# Patient Record
Sex: MALE | Race: WHITE
[De-identification: names, ages, dates, MRNs, and addresses within clinical notes are randomized per-mention and may not be internally consistent; named-entity substitution may affect disease eponyms.]

---

## 2017-07-04 ENCOUNTER — HOSPITAL ENCOUNTER (INPATIENT)
Dept: HOSPITAL 10 - E/R | Age: 57
LOS: 3 days | Discharge: LEFT BEFORE DISCHARGE | DRG: 193 | End: 2017-07-07
Payer: MEDICAID

## 2017-07-04 VITALS
WEIGHT: 315 LBS | BODY MASS INDEX: 37.19 KG/M2 | HEIGHT: 77 IN | WEIGHT: 315 LBS | BODY MASS INDEX: 37.19 KG/M2 | HEIGHT: 77 IN

## 2017-07-04 DIAGNOSIS — J18.9: Primary | ICD-10-CM

## 2017-07-04 DIAGNOSIS — I31.3: ICD-10-CM

## 2017-07-04 DIAGNOSIS — I11.0: ICD-10-CM

## 2017-07-04 DIAGNOSIS — F10.10: ICD-10-CM

## 2017-07-04 DIAGNOSIS — I48.91: ICD-10-CM

## 2017-07-04 DIAGNOSIS — E78.5: ICD-10-CM

## 2017-07-04 DIAGNOSIS — I50.33: ICD-10-CM

## 2017-07-04 DIAGNOSIS — E66.9: ICD-10-CM

## 2017-07-04 DIAGNOSIS — I89.0: ICD-10-CM

## 2017-07-04 DIAGNOSIS — E11.65: ICD-10-CM

## 2017-07-04 LAB
ABNORMAL IP MESSAGE: 1
ADD SCAN DIFF: NO
ADD UMIC: NO
ALBUMIN SERPL-MCNC: 3.6 G/DL (ref 3.3–4.9)
ALBUMIN/GLOB SERPL: 1.38 {RATIO}
ALP SERPL-CCNC: 145 IU/L (ref 42–121)
ALT SERPL-CCNC: 75 IU/L (ref 13–69)
ANION GAP SERPL CALC-SCNC: 17 MMOL/L (ref 8–16)
APTT BLD: 32.2 SEC (ref 25–35)
AST SERPL-CCNC: 54 IU/L (ref 15–46)
BASOPHILS # BLD AUTO: 0 10^3/UL (ref 0–0.1)
BASOPHILS NFR BLD: 0.2 % (ref 0–2)
BILIRUB DIRECT SERPL-MCNC: 0 MG/DL (ref 0–0.2)
BILIRUB SERPL-MCNC: 0.4 MG/DL (ref 0.2–1.3)
BNP SERPL-MCNC: 1380 PG/ML (ref 0–125)
BUN SERPL-MCNC: 14 MG/DL (ref 7–20)
CALCIUM SERPL-MCNC: 8.9 MG/DL (ref 8.4–10.2)
CHLORIDE SERPL-SCNC: 91 MMOL/L (ref 97–110)
CO2 SERPL-SCNC: 28 MMOL/L (ref 21–31)
COLOR UR: (no result)
CREAT SERPL-MCNC: 0.61 MG/DL (ref 0.61–1.24)
EOSINOPHIL # BLD: 0.1 10^3/UL (ref 0–0.5)
EOSINOPHIL NFR BLD: 1.4 % (ref 0–7)
ERYTHROCYTE [DISTWIDTH] IN BLOOD BY AUTOMATED COUNT: 12.8 % (ref 11.5–14.5)
GLOBULIN SER-MCNC: 2.6 G/DL (ref 1.3–3.2)
GLUCOSE SERPL-MCNC: 370 MG/DL (ref 70–220)
GLUCOSE UR STRIP-MCNC: (no result) MG/DL
HCT VFR BLD CALC: 45 % (ref 42–52)
HGB BLD-MCNC: 14.7 G/DL (ref 14–18)
INR PPP: 1.1
KETONES UR STRIP.AUTO-MCNC: NEGATIVE MG/DL
LYMPHOCYTES # BLD AUTO: 2 10^3/UL (ref 0.8–2.9)
LYMPHOCYTES NFR BLD AUTO: 22.3 % (ref 15–51)
MCH RBC QN AUTO: 30.6 PG (ref 29–33)
MCHC RBC AUTO-ENTMCNC: 32.7 G/DL (ref 32–37)
MCV RBC AUTO: 93.6 FL (ref 82–101)
MONOCYTES # BLD: 0.7 10^3/UL (ref 0.3–0.9)
MONOCYTES NFR BLD: 8.4 % (ref 0–11)
NEUTROPHILS # BLD: 6 10^3/UL (ref 1.6–7.5)
NEUTROPHILS NFR BLD AUTO: 67.4 % (ref 39–77)
NITRITE UR QL STRIP.AUTO: NEGATIVE MG/DL
NRBC # BLD MANUAL: 0 10^3/UL (ref 0–0)
NRBC BLD QL: 0 /100WBC (ref 0–0)
PLATELET # BLD: 159 10^3/UL (ref 140–415)
PMV BLD AUTO: 13.3 FL (ref 7.4–10.4)
POTASSIUM SERPL-SCNC: 4.1 MMOL/L (ref 3.5–5.1)
PROT SERPL-MCNC: 6.2 G/DL (ref 6.1–8.1)
PROTHROMBIN TIME: 14.2 SEC (ref 12.2–14.2)
PT RATIO: 1.1
RBC # BLD AUTO: 4.81 10^6/UL (ref 4.7–6.1)
RBC # UR AUTO: NEGATIVE MG/DL
SODIUM SERPL-SCNC: 132 MMOL/L (ref 135–144)
TROPONIN I SERPL-MCNC: 0.02 NG/ML (ref 0–0.12)
UR ASCORBIC ACID: NEGATIVE MG/DL
UR BILIRUBIN (DIP): NEGATIVE MG/DL
UR CLARITY: CLEAR
UR PH (DIP): 6 (ref 5–9)
UR SPECIFIC GRAVITY (DIP): 1 (ref 1–1.03)
UR TOTAL PROTEIN (DIP): NEGATIVE MG/DL
UROBILINOGEN UR STRIP-ACNC: NEGATIVE MG/DL
WBC # BLD AUTO: 8.8 10^3/UL (ref 4.8–10.8)
WBC # UR STRIP: NEGATIVE LEU/UL

## 2017-07-04 PROCEDURE — 76705 ECHO EXAM OF ABDOMEN: CPT

## 2017-07-04 PROCEDURE — 83605 ASSAY OF LACTIC ACID: CPT

## 2017-07-04 PROCEDURE — 83735 ASSAY OF MAGNESIUM: CPT

## 2017-07-04 PROCEDURE — 93306 TTE W/DOPPLER COMPLETE: CPT

## 2017-07-04 PROCEDURE — C9113 INJ PANTOPRAZOLE SODIUM, VIA: HCPCS

## 2017-07-04 PROCEDURE — 93005 ELECTROCARDIOGRAM TRACING: CPT

## 2017-07-04 PROCEDURE — 85025 COMPLETE CBC W/AUTO DIFF WBC: CPT

## 2017-07-04 PROCEDURE — 80061 LIPID PANEL: CPT

## 2017-07-04 PROCEDURE — 85610 PROTHROMBIN TIME: CPT

## 2017-07-04 PROCEDURE — 96375 TX/PRO/DX INJ NEW DRUG ADDON: CPT

## 2017-07-04 PROCEDURE — 36415 COLL VENOUS BLD VENIPUNCTURE: CPT

## 2017-07-04 PROCEDURE — 80048 BASIC METABOLIC PNL TOTAL CA: CPT

## 2017-07-04 PROCEDURE — 71275 CT ANGIOGRAPHY CHEST: CPT

## 2017-07-04 PROCEDURE — 87040 BLOOD CULTURE FOR BACTERIA: CPT

## 2017-07-04 PROCEDURE — 96374 THER/PROPH/DIAG INJ IV PUSH: CPT

## 2017-07-04 PROCEDURE — 82550 ASSAY OF CK (CPK): CPT

## 2017-07-04 PROCEDURE — 83036 HEMOGLOBIN GLYCOSYLATED A1C: CPT

## 2017-07-04 PROCEDURE — 81003 URINALYSIS AUTO W/O SCOPE: CPT

## 2017-07-04 PROCEDURE — 80053 COMPREHEN METABOLIC PANEL: CPT

## 2017-07-04 PROCEDURE — 87086 URINE CULTURE/COLONY COUNT: CPT

## 2017-07-04 PROCEDURE — 84100 ASSAY OF PHOSPHORUS: CPT

## 2017-07-04 PROCEDURE — 82553 CREATINE MB FRACTION: CPT

## 2017-07-04 PROCEDURE — 84443 ASSAY THYROID STIM HORMONE: CPT

## 2017-07-04 PROCEDURE — 84484 ASSAY OF TROPONIN QUANT: CPT

## 2017-07-04 PROCEDURE — 74176 CT ABD & PELVIS W/O CONTRAST: CPT

## 2017-07-04 PROCEDURE — 82962 GLUCOSE BLOOD TEST: CPT

## 2017-07-04 PROCEDURE — 83880 ASSAY OF NATRIURETIC PEPTIDE: CPT

## 2017-07-04 PROCEDURE — 85730 THROMBOPLASTIN TIME PARTIAL: CPT

## 2017-07-04 PROCEDURE — 71010: CPT

## 2017-07-04 RX ADMIN — DILTIAZEM HCL 125 MG/125ML IN DEXTROSE 5% IV SOLN (1 MG/ML) SCH MLS/HR: 125-5/125 SOLUTION at 23:04

## 2017-07-04 NOTE — ERA
ER Documentation


Chief Complaint


Date/Time


DATE: 17 


TIME: 21:10


Chief Complaint


RA89 SOB,CP,hx pulm edema,88% on 2 lpm via NC





HPI


57-year-old male with history of diabetes, hypertension, hyperlipidemia and 

heart disease presents to the ED via rescue ambulance complaining of a 3 day 

history of worsening shortness of breath and several hour history of increasing

, sharp and pressure-like nonradiating chest pain.  Nausea with one episode of 

nonbloody nonbilious emesis but no diaphoresis.   Received aspirin, sublingual 

nitroglycerin and nebulized albuterol in route with some relief.Also 

complaining of a several day history of subjective fevers but denies cough or 

sputum production.  No abdominal pain, nausea, vomiting, diarrhea or 

constipation.  Leg swelling but no pain.  No dysuria, polyuria or flank pain.





ROS


All systems reviewed and are negative except as per history of present illness.





Allergies


Allergies:  


Coded Allergies:  


     No Known Allergy (Unverified , 17)





PMhx/Soc


Reviewed in chart. As per HPI.


Hx Cardiac Disorders:  Yes (A. Fib, HTN)


Hx Miscellaneous Medical Probl:  Yes (DM)


Hx Alcohol Use:  No


Hx Substance Use:  No


Hx Tobacco Use:  No


Smoking Status:  Never smoker





FmHx


No stroke or cancer





Physical Exam


Vitals





Vital Signs








  Date Time  Temp Pulse Resp B/P Pulse Ox O2 Delivery O2 Flow Rate FiO2


 


17 22:28  103 13 133/78 92 Nasal Cannula 5.0 


 


17 21:53  154 37 136/78 97 Nasal Cannula 5.0 


 


17 21:17      Nasal Cannula 5 


 


17 21:11 102.8 144 23 130/87 95   








Physical Exam


Const:    Alert, moderate distress


Head:   Atraumatic 


Eyes:    Normal Conjunctiva


ENT:    Normal External Ears, Nose and Mouth.


Neck:               Full range of motion.  Nontender.


Resp:   Breath sounds diminished at the bases otherwise clear without rales, 

rhonchi or wheezes


Cardio:   Tachycardic, irregular rate and rhythm, no murmurs


Abd:    Soft, obese non tender, non distended. Normal bowel sounds


Skin:    No petechiae or rashes


Back:    No midline or flank tenderness


Ext:    2+ pitting edema bilateral lower external


Neur:    Awake and alert.  No focal deficit observed


Psych:    Normal Mood and Affect


Result Diagram:  


17 0554                                                                    

            17 0554





Results 24 hrs





 Laboratory Tests








Test


  17


21:15 17


22:00


 


White Blood Count 8.810^3/ul  


 


Red Blood Count 4.8110^6/ul  


 


Hemoglobin 14.7g/dl  


 


Hematocrit 45.0%  


 


Mean Corpuscular Volume 93.6fl  


 


Mean Corpuscular Hemoglobin 30.6pg  


 


Mean Corpuscular Hemoglobin


Concent 32.7g/dl 


  


 


 


Red Cell Distribution Width 12.8%  


 


Platelet Count 43515^3/UL  


 


Mean Platelet Volume 13.3fl  


 


Neutrophils % 67.4%  


 


Lymphocytes % 22.3%  


 


Monocytes % 8.4%  


 


Eosinophils % 1.4%  


 


Basophils % 0.2%  


 


Nucleated Red Blood Cells % 0.0/100WBC  


 


Neutrophils # 6.010^3/ul  


 


Lymphocytes # 2.010^3/ul  


 


Monocytes # 0.710^3/ul  


 


Eosinophils # 0.110^3/ul  


 


Basophils # 0.010^3/ul  


 


Nucleated Red Blood Cells # 0.010^3/ul  


 


Prothrombin Time 14.2Sec  


 


Prothrombin Time Ratio 1.1  


 


INR International Normalized


Ratio 1.10 


  


 


 


Activated Partial


Thromboplast Time 32.2Sec 


  


 


 


Sodium Level 132mmol/L  


 


Potassium Level 4.1mmol/L  


 


Chloride Level 91mmol/L  


 


Carbon Dioxide Level 28mmol/L  


 


Anion Gap 17  


 


Blood Urea Nitrogen 14mg/dl  


 


Creatinine 0.61mg/dl  


 


Glucose Level 370mg/dl  


 


Lactic Acid Level 1.3mmol/L  


 


Calcium Level 8.9mg/dl  


 


Magnesium Level 1.4mg/dl  


 


Total Bilirubin 0.4mg/dl  


 


Direct Bilirubin 0.00mg/dl  


 


Indirect Bilirubin 0.4mg/dl  


 


Aspartate Amino Transf


(AST/SGOT) 54IU/L 


  


 


 


Alanine Aminotransferase


(ALT/SGPT) 75IU/L 


  


 


 


Alkaline Phosphatase 145IU/L  


 


Troponin I 0.019ng/ml  


 


B-Type Natriuretic Peptide 1380PG/ML  


 


Total Protein 6.2g/dl  


 


Albumin 3.6g/dl  


 


Globulin 2.60g/dl  


 


Albumin/Globulin Ratio 1.38  


 


Urine Color  STRAW 


 


Urine Clarity  CLEAR 


 


Urine pH  6.0 


 


Urine Specific Gravity  1.005 


 


Urine Ketones  NEGATIVEmg/dL 


 


Urine Nitrite  NEGATIVEmg/dL 


 


Urine Bilirubin  NEGATIVEmg/dL 


 


Urine Urobilinogen  NEGATIVEmg/dL 


 


Urine Leukocyte Esterase  NEGATIVELeu/ul 


 


Urine Hemoglobin  NEGATIVEmg/dL 


 


Urine Glucose  3+mg/dL 


 


Urine Total Protein  NEGATIVEmg/dl 








 Current Medications








 Medications


  (Trade)  Dose


 Ordered  Sig/Amber


 Route


 PRN Reason  Start Time


 Stop Time Status Last Admin


Dose Admin


 


 Nitroglycerin


  (Nitroglycerin


 2% Oint)  1 inch  ONCE  STAT


 TD


   17 21:14


 17 21:16 DC 17 21:23


 


 


 Furosemide


  (Lasix)  40 mg  ONCE  ONCE


 IV


   17 21:30


 17 21:31 DC 17 21:27


 


 


 Acetaminophen


  (Tylenol Tab)  1,000 mg  ONCE  STAT


 PO


   17 21:32


 17 21:33 DC 17 21:48


 


 


 Diltiazem HCl


  (Cardizem Iv)  20 mg  ONCE  ONCE


 IV


   17 22:00


 17 22:03 DC 17 22:11


 











EKG: TIME: 21:30.  Atrial fibrillation with RVR.  Occasional premature 

aberrantly conducted complexes.  Right bundle branch block.  No acute ST 

segment elevation or depression.  EP Interpretation: Abnormal EKG. 





IMAGING:








PROCEDURE:   XR Chest. 


 


CLINICAL INDICATION:     Chest pain.   Shortness of breath


 


TECHNIQUE:   Portable AP semi erect view of the chest was obtained. 


 


COMPARISON:   None. 


 


FINDINGS:


 


The cardiomediastinal silhouette is enlarged.  The lungs are clear.  There is 

no evidence for pleural effusion, pneumothorax or pulmonary vascular 

congestion.  The osseous structures are intact with no evidence for acute 

abnormality.  


 


RPTAT:HJJR


 


IMPRESSION:


 


Cardiac silhouette enlargement without evidence for acute intrathoracic 

pathology.


_____________________________________________ 


Physician Brandon           Date    Time 


Electronically viewed and signed by Physician Brandon on 2017 21:47 


 


D:  2017 21:47  T:  2017 21:47


JR/





Procedures/MDM


DOCUMENTS REVIEWED:  ED nurse, no prior





REEXAMINATION/REEVALUATION: Time:22:15.  Post Cardizem still in atrial 

fibrillation with ventricular rate of 98.  Chest pain decreased.








MEDICAL DECISION MAKIN-year-old male with history of diabetes, hypertension

, hyperlipidemia and heart disease presents to the ED via rescue ambulance 

complaining of a 3 day history of worsening shortness of breath and several 

hour history of increasing, sharp and pressure-like nonradiating chest pain.  

Patient presents with atrial fibrillation with rapid ventricular response rate 

control achieved with diltiazem bolus and continuous infusion.  Chest pain but 

no acute ischemic EKG changes or elevated troponin.  Acute febrile illness with 

multiple SIRS criteria but no source of infection identified.  Chest x-ray is 

negative for effusions or infiltrates.  Abdominal exam is benign without rebound

, guarding or signs of peritonitis.  No elevated lactate or criteria for severe 

sepsis or septic shock.  Hyperglycemia but no DKA or HONK.  Patient will be 

admitted to telemetry for further evaluation and management.





Counseled patient regarding diagnosis, diagnostic results and plan for 

admission. 








Critical Care Note:





Treatments/Evaluations: Close monitoring and treatment of unstable vital signs, 

cardiorespiratory, and neurologic status, while maintaining tight balance of 

fluid, respiratory, and cardiac interventions. This time includes discussing 

the case with the patient and the patient's family. This time does not include 

all procedures stated elsewhere in this record. This time also includes 

reviewing old records, labs and radiological studies. This time includes 

examining and re-examining the patient. Additionally, this time also includes 

arranging care with admitting and consulting physicians.  Total critical care 

time not including other separately reportable procedures: 35 minutes.











CALLS/CONSULTS: Time  22:35, Dr. Gonzales, Recommends admission to telemetry.





PATIENT CARE TRANSITIONED: Time: 22:35 Dr. Gonzales.





Departure


Diagnosis:  


 Primary Impression:  


 Acute chest pain


 Additional Impressions:  


 Atrial fibrillation with rapid ventricular response


 Fever


 Qualified Code:  R50.9 - Fever, unspecified fever cause


 Diabetes mellitus type 2 in obese


 Hypertension


 Qualified Code:  I10 - Essential hypertension


Condition:  Serious











ZACH FERGUSON MD 2017 22:30

## 2017-07-04 NOTE — RADRPT
PROCEDURE:   XR Chest. 

 

CLINICAL INDICATION:     Chest pain.   Shortness of breath

 

TECHNIQUE:   Portable AP semi erect view of the chest was obtained. 

 

COMPARISON:   None. 

 

FINDINGS:

 

The cardiomediastinal silhouette is enlarged.  The lungs are clear.  There is no evidence for pleura
l effusion, pneumothorax or pulmonary vascular congestion.  The osseous structures are intact with n
o evidence for acute abnormality.  

 

RPTAT:HJJR

 

IMPRESSION:

 

Cardiac silhouette enlargement without evidence for acute intrathoracic pathology.

_____________________________________________ 

Physician Brandon           Date    Time 

Electronically viewed and signed by Physician Brandon on 07/04/2017 21:47 

 

D:  07/04/2017 21:47  T:  07/04/2017 21:47

JR/

## 2017-07-05 VITALS — SYSTOLIC BLOOD PRESSURE: 160 MMHG | DIASTOLIC BLOOD PRESSURE: 79 MMHG | RESPIRATION RATE: 18 BRPM | HEART RATE: 110 BPM

## 2017-07-05 VITALS — HEART RATE: 96 BPM

## 2017-07-05 VITALS — DIASTOLIC BLOOD PRESSURE: 82 MMHG | SYSTOLIC BLOOD PRESSURE: 138 MMHG | RESPIRATION RATE: 18 BRPM

## 2017-07-05 VITALS — RESPIRATION RATE: 18 BRPM | SYSTOLIC BLOOD PRESSURE: 115 MMHG | DIASTOLIC BLOOD PRESSURE: 55 MMHG

## 2017-07-05 VITALS — HEART RATE: 112 BPM

## 2017-07-05 VITALS — DIASTOLIC BLOOD PRESSURE: 74 MMHG | SYSTOLIC BLOOD PRESSURE: 115 MMHG | RESPIRATION RATE: 18 BRPM

## 2017-07-05 VITALS — HEART RATE: 117 BPM

## 2017-07-05 VITALS — HEART RATE: 116 BPM

## 2017-07-05 VITALS — HEART RATE: 104 BPM

## 2017-07-05 VITALS — DIASTOLIC BLOOD PRESSURE: 77 MMHG | SYSTOLIC BLOOD PRESSURE: 135 MMHG | RESPIRATION RATE: 20 BRPM

## 2017-07-05 LAB
ABNORMAL IP MESSAGE: 1
ADD SCAN DIFF: NO
ALBUMIN SERPL-MCNC: 3.6 G/DL (ref 3.3–4.9)
ALBUMIN/GLOB SERPL: 1.33 {RATIO}
ALP SERPL-CCNC: 138 IU/L (ref 42–121)
ALT SERPL-CCNC: 70 IU/L (ref 13–69)
ANION GAP SERPL CALC-SCNC: 20 MMOL/L (ref 8–16)
AST SERPL-CCNC: 41 IU/L (ref 15–46)
BASOPHILS # BLD AUTO: 0 10^3/UL (ref 0–0.1)
BASOPHILS NFR BLD: 0.4 % (ref 0–2)
BILIRUB DIRECT SERPL-MCNC: 0 MG/DL (ref 0–0.2)
BILIRUB SERPL-MCNC: 0.4 MG/DL (ref 0.2–1.3)
BUN SERPL-MCNC: 15 MG/DL (ref 7–20)
CALCIUM SERPL-MCNC: 8.7 MG/DL (ref 8.4–10.2)
CHLORIDE SERPL-SCNC: 91 MMOL/L (ref 97–110)
CHOLEST SERPL-MCNC: 87 MG/DL (ref 100–200)
CHOLEST/HDLC SERPL: 4.3 RATIO
CK MB SERPL-MCNC: 1.2 NG/ML (ref 0–2.4)
CK MB SERPL-MCNC: 1.22 NG/ML (ref 0–2.4)
CK SERPL-CCNC: 121 IU/L (ref 23–200)
CK SERPL-CCNC: 94 IU/L (ref 23–200)
CO2 SERPL-SCNC: 29 MMOL/L (ref 21–31)
CREAT SERPL-MCNC: 0.63 MG/DL (ref 0.61–1.24)
EOSINOPHIL # BLD: 0.1 10^3/UL (ref 0–0.5)
EOSINOPHIL NFR BLD: 1.1 % (ref 0–7)
ERYTHROCYTE [DISTWIDTH] IN BLOOD BY AUTOMATED COUNT: 12.6 % (ref 11.5–14.5)
GLOBULIN SER-MCNC: 2.7 G/DL (ref 1.3–3.2)
GLUCOSE SERPL-MCNC: 327 MG/DL (ref 70–220)
HCT VFR BLD CALC: 47.8 % (ref 42–52)
HDLC SERPL-MCNC: 20 MG/DL (ref 28–71)
HGB BLD-MCNC: 15.6 G/DL (ref 14–18)
LYMPHOCYTES # BLD AUTO: 1.4 10^3/UL (ref 0.8–2.9)
LYMPHOCYTES NFR BLD AUTO: 19.6 % (ref 15–51)
MAGNESIUM SERPL-MCNC: 1.5 MG/DL (ref 1.7–2.5)
MCH RBC QN AUTO: 30.8 PG (ref 29–33)
MCHC RBC AUTO-ENTMCNC: 32.6 G/DL (ref 32–37)
MCV RBC AUTO: 94.5 FL (ref 82–101)
MONOCYTES # BLD: 0.5 10^3/UL (ref 0.3–0.9)
MONOCYTES NFR BLD: 7.7 % (ref 0–11)
NEUTROPHILS # BLD: 5 10^3/UL (ref 1.6–7.5)
NEUTROPHILS NFR BLD AUTO: 70.8 % (ref 39–77)
NRBC # BLD MANUAL: 0 10^3/UL (ref 0–0)
NRBC BLD QL: 0 /100WBC (ref 0–0)
PLATELET # BLD: 151 10^3/UL (ref 140–415)
PMV BLD AUTO: 13.2 FL (ref 7.4–10.4)
POTASSIUM SERPL-SCNC: 3.5 MMOL/L (ref 3.5–5.1)
PROT SERPL-MCNC: 6.3 G/DL (ref 6.1–8.1)
RBC # BLD AUTO: 5.06 10^6/UL (ref 4.7–6.1)
SODIUM SERPL-SCNC: 136 MMOL/L (ref 135–144)
TRIGL SERPL-MCNC: 109 MG/DL (ref 0–149)
TROPONIN I SERPL-MCNC: 0.01 NG/ML (ref 0–0.12)
TROPONIN I SERPL-MCNC: 0.03 NG/ML (ref 0–0.12)
TSH SERPL-ACNC: 0.68 MIU/L (ref 0.47–4.68)
WBC # BLD AUTO: 7.1 10^3/UL (ref 4.8–10.8)

## 2017-07-05 RX ADMIN — DILTIAZEM HYDROCHLORIDE SCH MG: 60 TABLET, FILM COATED ORAL at 18:21

## 2017-07-05 RX ADMIN — DILTIAZEM HCL 125 MG/125ML IN DEXTROSE 5% IV SOLN (1 MG/ML) SCH MLS/HR: 125-5/125 SOLUTION at 11:30

## 2017-07-05 RX ADMIN — DILTIAZEM HCL 125 MG/125ML IN DEXTROSE 5% IV SOLN (1 MG/ML) SCH MLS/HR: 125-5/125 SOLUTION at 09:22

## 2017-07-05 RX ADMIN — PANTOPRAZOLE SODIUM SCH MG: 40 INJECTION, POWDER, FOR SOLUTION INTRAVENOUS at 05:16

## 2017-07-05 RX ADMIN — CEFTRIAXONE SCH MLS/HR: 2 INJECTION, SOLUTION INTRAVENOUS at 02:11

## 2017-07-05 RX ADMIN — DOXYCYCLINE SCH MLS/HR: 100 INJECTION, POWDER, LYOPHILIZED, FOR SOLUTION INTRAVENOUS at 10:30

## 2017-07-05 RX ADMIN — DOXYCYCLINE SCH MLS/HR: 100 INJECTION, POWDER, LYOPHILIZED, FOR SOLUTION INTRAVENOUS at 21:07

## 2017-07-05 RX ADMIN — DILTIAZEM HYDROCHLORIDE SCH MG: 60 TABLET, FILM COATED ORAL at 23:47

## 2017-07-05 RX ADMIN — DOXYCYCLINE SCH MLS/HR: 100 INJECTION, POWDER, LYOPHILIZED, FOR SOLUTION INTRAVENOUS at 02:50

## 2017-07-05 RX ADMIN — MAGNESIUM OXIDE TAB 400 MG (241.3 MG ELEMENTAL MG) SCH MG: 400 (241.3 MG) TAB at 21:07

## 2017-07-05 RX ADMIN — LISINOPRIL SCH MG: 5 TABLET ORAL at 08:25

## 2017-07-05 NOTE — HP
Date/Time of Note


Date/Time of Note


DATE: 7/5/17 


TIME: 00:41





Assessment/Plan


VTE Prophylaxis


VTE Prophylaxis Intervention:  SCD's





Lines/Catheters


IV Catheter Type (from [facility]):  Saline Lock





Assessment/Plan


Chief Complaint/Hosp Course


This is a 57-year-old male being admitted to the telemetry floor for:





#1 chest pain: ACS versus A. fib with RVR versus pneumonia.  Patient has a 

significant past medical history we will trend cardiac markers.  Will check a 

2D echo.  As needed nitro.





#2 A. fib with RVR: Currently rate controlled on Cardizem drip.  Patient not on 

any anticoagulation at home except for aspirin.  Patient states that he was 

told in the past that he should not be taking anticoagulation secondary to his 

hemorrhage that resulted in him needing abdominal surgery.  Will defer 

anticoagulation to cardiology.





#3 Fever: Patient came in with normal white blood cell count.  However his 

fever and cough do not fit the clinical picture though his chest x-ray is 

normal and I have suspicion for underlying pneumonia therefore I will order a 

CT of the chest to further evaluate at the current time I will order a CT of 

the chest to further evaluate. 





#4 diabetes mellitus: We will check a hemoglobin A1c level.  Put patient on 

insulin sliding scale.





#5 hypertension: Patient currently does not recall any of his medications.  We 

will start him on a low-dose ACE inhibitor at this time.





#6 History of CHF: BNP of 1300 however his x-ray does not show any vascular 

congestion though he does have lower extremity edema, which also could be due 

to possibly underlying sleep apnea.  The current time will order echocardiogram 

to evaluate and consult cardiology.





#7 DVT and GI prophylaxis: SCDs, Protonix





Further treatment strategy as per the clinical course


Problems:  





HPI/ROS


Admit Date/Time


Admit Date/Time








Hx of Present Illness


Chief complaint: Shortness of breath 3 days, swelling in the leg  5 days





This is a 57-year-old male with history of diabetes, hypertension, 

hyperlipidemia and heart disease presents to the ED via rescue ambulance 

complaining of a 3 day history of worsening shortness of breath and several 

hour history of increasing, sharp and pressure-like nonradiating chest pain.  

Nausea with one episode of nonbloody nonbilious emesis but no diaphoresis.   

Received aspirin, sublingual nitroglycerin and nebulized albuterol in route 

with some relief.Also complaining of a several day history of subjective fevers 

and cough but denies sputum production.  No abdominal pain, nausea, vomiting, 

diarrhea or constipation.  Leg swelling but no pain.  No dysuria, polyuria or 

flank pain.





Allergies: NKDA





Medications: Patient does not know his medications.


Patient states that he was not put on any anticoagulation for his A. fib 

secondary to his previous surgery where he did well had a hemorrhage





ROS


Const: As per HPI


Eyes : No pain discharge or redness or change in visual acuity


ENT: No pain, sore throat, congestion, congestion,  dysphagia or discharge


Respiratory: As per HPI


Cardiovascular: As per HPI


GI : no change in appetite, abdominal pain, nausea, vomiting, diarrhea, 

constipation, or change in the color his stool 


Genitourinary: No dysuria, hematuria, flank pain ,  discharge or CVA tenderness


Musculoskeletal: No joint pain, back pain, neck pain, restricted range of 

motion in neck or joints


Skin: No rash, bruising or hives 


Neuro: No headache, dizziness, syncope, seizure, focal weakness


Endocrine: No polyuria, polydipsia, temperature intolerance


Psych: No hallucination, depression, anxiety or suicidal ideation





PMH/Family/Social


Past Medical History


Diabetes mellitus, hypertension, CHF, atrial fibrillation





Past Surgical History


Abdominal surgery for hemorrhage?





Family History


Significant Family History:  diabetes





Social History


Alcohol Use:  none


Smoking Status:  Never smoker


Drug Use:  none





Exam/Review of Systems


Vital Signs


Vitals





 Vital Signs








  Date Time  Temp Pulse Resp B/P Pulse Ox O2 Delivery O2 Flow Rate FiO2


 


7/4/17 23:35  127 21 128/92 95 Nasal Cannula 4.0 


 


7/4/17 21:11 102.8       














 Intake and Output   


 


 7/4/17 7/4/17 7/5/17





 15:00 23:00 07:00


 


Output Total  675 ml 


 


Balance  -675 ml 











Exam


Exam





General: Patient is a morbidly obese male in no acute distress


HEENT: Atraumatic, normocephalic. The pupils are equal, round and reactive. 

Extraocular motor are intact


Neck: Supple with full range of motion. No rigidity or meningismus


Chest: Nontender


Lungs: Coarse breath sounds at the bases bilaterally


Heart: Normal S1-S2, Regular rhythm and rate. 


Abdomen: Soft, distended, nontender, surgical scar present.  Normal bowel sounds


Extremities: 2+ pitting edema of the bilateral lower extremities


Neurologic: Normal mental status, speech normal, cranial nerves II through XII 

are intact, motor and sensory are intact, no focal weakness





Labs


Result Diagram:  


7/4/17 2115 7/4/17 2115








Medications


Medications





 Current Medications


Diltiazem HCl (Cardizem-D5W 125 Mg/125 ml Drip) 125 ml @  10 mls/hr [id number] IV  

Last administered on 7/4/17at 23:04; Admin Dose 10 MLS/HR;  Start 7/4/17 at 23:

00











ROHITH MCKENZIE Jul 5, 2017 00:51

## 2017-07-05 NOTE — CONS
Date/Time of Note


Date/Time of Note


DATE: 7/5/17 


TIME: 12:40





Assessment/Plan


Assessment/Plan


Additional Assessment/Plan


Likely pneumonia


Acute decompensated diastolic congestive heart failure


Atrial fibrillation with rapid ventricular rates, improved, not on 

anticoagulation secondary to history of GI bleed


Preserved ejection fraction


Sepsis


Hypertension


Small pericardial effusion





-Patient with symptoms of fevers and chills, cough and CT chest evidence of 

pneumonia.  Patient's symptoms have improved with antibiotics.  Also evidence 

of mild decompensated congestive heart failure.  Would continue Lasix at the 

current time as blood pressure renal function permits.  Heart rate trend has 

improved but still on IV Cardizem.  Will start p.o. Cardizem and transition off 

the IV.  Maintain potassium above 4.0 and magnesium above 2.0.  In discussion 

with patient, he had a history of GI bleed on anticoagulation and he was told 

not to take anticoagulation in the future.





Consultation Date/Type/Reason


Admit Date/Time





Type of Consultation:  cv


Reason for Consultation


Atrial fibrillation





Hx of Present Illness


This is a 57-year-old male with past medical history of hypertension, atrial 

fibrillation who presents with 3 days history of fevers and chills, shortness 

of breath and fatigue.  Symptoms progressively worsened and he came to the 

emergency room for evaluation and care.  He is also complaining of cough which 

has been occasionally productive.  With coughing, he developed sharp chest 

discomfort.  He denies any palpitations or dizziness.  He was given nitro 

ointment with no significant change in his chest discomfort but he has 

developed a headache since.  Since his admission and being giving multiple 

medications including antibiotics, he is feeling much better.  Currently denies 

any shortness of breath but complains of fatigue.  His chest discomfort is only 

present with coughing.  Shortness of breath is slightly better.


12 point review of systems was performed with all pertinent positives and 

negatives mentioned above and all else is negative





Past Medical History


Atrial fibrillation


GI bleed


Medical History:  congestive heart failure, diabetes, hypertension





Past Surgical History


GI procedure





Family History


Significant Family History:  no pertinent family hx





Social History


Alcohol Use:  none


Smoking Status:  Never smoker


Drug Use:  none





Exam/Review of Systems


Vital Signs


Vitals





 Vital Signs








  Date Time  Temp Pulse Resp B/P Pulse Ox O2 Delivery O2 Flow Rate FiO2


 


7/5/17 12:07  96      


 


7/5/17 11:12 98.6  18 115/74 91   


 


7/5/17 03:40      Nasal Cannula 2.0 














 Intake and Output   


 


 7/4/17 7/4/17 7/5/17





 14:59 22:59 06:59


 


Output Total  675 ml 550 ml


 


Balance  -675 ml -550 ml











Exam


Alert and oriented but appears tired and following asleep multiple times during 

history taking and examination, no dyspnea, no apparent distress


Constitutional:  obese


Head:  normocephalic


Neck:  supple


Respiratory:  other (Coarse breath sounds bilaterally with scattered mild 

crackles, no wheezing)


Cardiovascular:  irregular rhythm, other (S1-S2 heard)


Gastrointestinal:  bowel sounds, non-tender, other (No guarding), soft


Extremities:  edema





Results


Result Diagram:  


7/5/17 0554                                                                    

            7/5/17 0554





Results 24 hrs





Laboratory Tests








Test


  7/4/17


21:15 7/4/17


22:00 7/5/17


00:35 7/5/17


02:21


 


White Blood Count 8.8     


 


Red Blood Count 4.81     


 


Hemoglobin 14.7     


 


Hematocrit 45.0     


 


Mean Corpuscular Volume 93.6     


 


Mean Corpuscular Hemoglobin 30.6     


 


Mean Corpuscular Hemoglobin


Concent 32.7  


  


  


  


 


 


Red Cell Distribution Width 12.8     


 


Platelet Count 159     


 


Mean Platelet Volume 13.3  H   


 


Neutrophils % 67.4     


 


Lymphocytes % 22.3     


 


Monocytes % 8.4     


 


Eosinophils % 1.4     


 


Basophils % 0.2     


 


Nucleated Red Blood Cells % 0.0     


 


Neutrophils # 6.0     


 


Lymphocytes # 2.0     


 


Monocytes # 0.7     


 


Eosinophils # 0.1     


 


Basophils # 0.0     


 


Nucleated Red Blood Cells # 0.0     


 


Prothrombin Time 14.2     


 


Prothrombin Time Ratio 1.1     


 


INR International Normalized


Ratio 1.10  


  


  


  


 


 


Activated Partial


Thromboplast Time 32.2  


  


  


  


 


 


Sodium Level 132  L   


 


Potassium Level 4.1     


 


Chloride Level 91  L   


 


Carbon Dioxide Level 28     


 


Anion Gap 17  H   


 


Blood Urea Nitrogen 14     


 


Creatinine 0.61     


 


Glucose Level 370  H   


 


Lactic Acid Level 1.3    1.1   1.2  


 


Calcium Level 8.9     


 


Magnesium Level 1.4  L   


 


Total Bilirubin 0.4     


 


Direct Bilirubin 0.00     


 


Indirect Bilirubin 0.4     


 


Aspartate Amino Transf


(AST/SGOT) 54  H


  


  


  


 


 


Alanine Aminotransferase


(ALT/SGPT) 75  H


  


  


  


 


 


Alkaline Phosphatase 145  H   


 


Troponin I 0.019     0.031  


 


B-Type Natriuretic Peptide 1380  H   


 


Total Protein 6.2     


 


Albumin 3.6     


 


Globulin 2.60     


 


Albumin/Globulin Ratio 1.38     


 


Urine Color  STRAW    


 


Urine Clarity  CLEAR    


 


Urine pH  6.0    


 


Urine Specific Gravity  1.005    


 


Urine Ketones  NEGATIVE    


 


Urine Nitrite  NEGATIVE    


 


Urine Bilirubin  NEGATIVE    


 


Urine Urobilinogen  NEGATIVE    


 


Urine Leukocyte Esterase  NEGATIVE    


 


Urine Hemoglobin  NEGATIVE    


 


Urine Glucose  3+  H  


 


Urine Total Protein  NEGATIVE    


 


Creatine Kinase    121  


 


Creatine Kinase Index    1.0  


 


Creatinine Kinase MB (Mass)    1.20  














Test


  7/5/17


05:54 7/5/17


08:22 7/5/17


09:30 


 


 


White Blood Count 7.1     


 


Red Blood Count 5.06     


 


Hemoglobin 15.6     


 


Hematocrit 47.8     


 


Mean Corpuscular Volume 94.5     


 


Mean Corpuscular Hemoglobin 30.8     


 


Mean Corpuscular Hemoglobin


Concent 32.6  


  


  


  


 


 


Red Cell Distribution Width 12.6     


 


Platelet Count 151     


 


Mean Platelet Volume 13.2  H   


 


Neutrophils % 70.8     


 


Lymphocytes % 19.6     


 


Monocytes % 7.7     


 


Eosinophils % 1.1     


 


Basophils % 0.4     


 


Nucleated Red Blood Cells % 0.0     


 


Neutrophils # 5.0     


 


Lymphocytes # 1.4     


 


Monocytes # 0.5     


 


Eosinophils # 0.1     


 


Basophils # 0.0     


 


Nucleated Red Blood Cells # 0.0     


 


Sodium Level 136     


 


Potassium Level 3.5     


 


Chloride Level 91  L   


 


Carbon Dioxide Level 29     


 


Anion Gap 20  H   


 


Blood Urea Nitrogen 15     


 


Creatinine 0.63     


 


Glucose Level 327  H   


 


Calcium Level 8.7     


 


Magnesium Level 1.5  L   


 


Total Bilirubin 0.4     


 


Direct Bilirubin 0.00     


 


Indirect Bilirubin 0.4     


 


Aspartate Amino Transf


(AST/SGOT) 41  


  


  


  


 


 


Alanine Aminotransferase


(ALT/SGPT) 70  H


  


  


  


 


 


Alkaline Phosphatase 138  H   


 


Total Protein 6.3     


 


Albumin 3.6     


 


Globulin 2.70     


 


Albumin/Globulin Ratio 1.33     


 


Triglycerides Level 109     


 


Cholesterol Level 87  L   


 


LDL Cholesterol, Calculated 45     


 


HDL Cholesterol 20  L   


 


Cholesterol/HDL Ratio 4.3     


 


Thyroid Stimulating Hormone


(TSH) 0.675  


  


  


  


 


 


Bedside Glucose  313  H  


 


Creatine Kinase   94   


 


Creatine Kinase Index   1.3   


 


Creatinine Kinase MB (Mass)   1.22   


 


Troponin I   0.013   











Medications


Medications





 Current Medications


Diltiazem HCl (Cardizem-D5W 125 Mg/125 ml Drip) 125 ml @  10 mls/hr [id number] IV  

Last administered on 7/5/17at 09:22; Admin Dose 10 MLS/HR;  Start 7/4/17 at 23:

00


Ondansetron HCl (Zofran Inj) 4 mg Q6H  PRN IV NAUSEA AND/OR VOMITING;  Start 7/5 /17 at 01:00


Nitroglycerin (Nitroglycerin (Sl Tab) 0.4 Mg) 1 tab Q5M  PRN SL CHEST PAIN;  

Start 7/5/17 at 01:00


Acetaminophen (Tylenol Tab) 650 mg Q6H  PRN PO PAIN LEVEL 1-3 OR FEVER;  Start 7 /5/17 at 01:00


Docusate Sodium (Colace) 100 mg Q12H  PRN PO CONSTIPATION;  Start 7/5/17 at 01:

00


Bisacodyl (Dulcolax) 5 mg DAILY  PRN PO CONSTIPATION;  Start 7/5/17 at 01:00


Pantoprazole (Protonix Iv) 40 mg DAILY@06 IV  Last administered on 7/5/17at 05:

16; Admin Dose 40 MG;  Start 7/5/17 at 06:00


Furosemide (Lasix) 40 mg DAILY@06 IV  Last administered on 7/5/17at 05:17; 

Admin Dose 40 MG;  Start 7/5/17 at 06:00


Lisinopril 5 mg 5 mg DAILY PO  Last administered on 7/5/17at 08:25; Admin Dose 

5 MG;  Start 7/5/17 at 09:00


Ceftriaxone Sodium 50 ml @  100 mls/hr Q24H IVPB  Last administered on 7/5/17at 

02:11; Admin Dose 100 MLS/HR;  Start 7/5/17 at 02:00


Doxycycline Hyclate/Sodium Chloride (Vibramycin/NS) 250 ml @  250 mls/hr Q12 

IVPB  Last administered on 7/5/17at 10:30; Admin Dose 250 MLS/HR;  Start 7/5/17 

at 01:00


Diagnostic Test (Pha) (Accu-Chek) 1 ea 02 XX ;  Start 7/6/17 at 02:00


Miscellaneous Information 1 ea NOTE XX ;  Start 7/5/17 at 03:00


Glucose (Glutose) 15 gm Q15M  PRN PO DECREASED GLUCOSE;  Start 7/5/17 at 03:00


Glucose (Glutose) 22.5 gm Q15M  PRN PO DECREASED GLUCOSE;  Start 7/5/17 at 03:00


Dextrose (D50w Syringe) 25 ml Q15M  PRN IV DECREASED GLUCOSE;  Start 7/5/17 at 

03:00


Dextrose (D50w Syringe) 50 ml Q15M  PRN IV DECREASED GLUCOSE;  Start 7/5/17 at 

03:00


Glucagon (Glucagen) 1 mg Q15M  PRN IM DECREASED GLUCOSE;  Start 7/5/17 at 03:00


Glucose (Glutose) 15 gm Q15M  PRN BUCCAL DECREASED GLUCOSE;  Start 7/5/17 at 03:

00


Morphine Sulfate (morphine) 2 mg Q3H  PRN IV PAIN Last administered on 7/5/17at 

10:38; Admin Dose 2 MG;  Start 7/5/17 at 10:30





Procedures


Procedures


ECG done yesterday demonstrates atrial fibrillation and 31 bpm, right bundle 

branch block with  ms, nonspecific ST-T abnormalities











Torey Gold DO Jul 5, 2017 12:49

## 2017-07-05 NOTE — RADRPT
PROCEDURE:   US Abdomen (right upper quadrant). 

 

CLINICAL INDICATION:   Abnormal liver function tests. 

 

TECHNIQUE:   Multiple real-time longitudinal and transverse images of the right upper quadrant of th
e abdomen were acquired utilizing a curved array transducer. Images were reviewed on a high-resoluti
on PACS workstation. 

 

COMPARISON:   CT scan of the abdomen and pelvis done earlier the same day. 

 

FINDINGS:

The liver is enlarged and diffusely increased in echogenicity consistent with fatty metamorphosis.

There is no focal hepatic lesion.  

The gallbladder is normal with no stones or wall thickening.  There is no pericholecystic fluid toribio
ection.

The bile ducts are normal with the common bile duct measuring 5.3 mm in diameter.  

The pancreas is not visualized due to overlying bowel gas.  

No free fluid is present.  

The right kidney measures 13.2 cm.  There is normal  echogenicity of the right kidney.  There is no 
right renal mass or hydronephrosis.  A nonobstructing 0.4 cm calculus is present in the lower right 
kidney. 

 

IMPRESSION:

1.  Hepatomegaly.

2.  Fatty metamorphosis of the liver.

3.  Pancreas not visualized.

4.  Nonobstructing 0.4 cm calculus in the lower right kidney.

5.  Otherwise unremarkable study. 

 

RPTAT: QQ

_____________________________________________ 

.Yaniv Rothman MD, MD           Date    Time 

Electronically viewed and signed by .Yaniv Rothman MD, MD on 07/05/2017 23:13 

 

D:  07/05/2017 23:13  T:  07/05/2017 23:13

.R/

## 2017-07-05 NOTE — RADRPT
PROCEDURE:    CT angiogram of the chest with contrast. 

 

CLINICAL INDICATION:    Chest pain and shortness of breath. 

 

TECHNIQUE:    CT angiogram of the chest was obtained using a multi-detector high-resolution CT.  Con
tiguous axial images were obtained during the dynamic injection of 140 cc of Omnipaque 350 intraveno
us contrast.  Coronal and sagittal reformatted images were obtained.  3-D reformatted images were al
so obtained.  Images were reviewed on a PACS workstation.

 

One or more of the following dose reduction techniques were used:

- Automated exposure control.

- Adjustment of the mA and/or kV according to patient size.

- Use of iterative reconstruction technique.

 

Exam CTD/vol = 24.47 mGy.

Total exam DLP = 1032.24 mGy-cm.   

 

COMPARISON:    None. 

 

FINDINGS:

The main pulmonary artery followed to the segmental divisions are well opacified.  There is no filli
ng defect or evidence of pulmonary embolism.  The heart is moderately enlarged with a small to moder
ate pericardial effusion.  The aorta is of normal course and caliber without evidence of aneurysm or
 dissection.

 

There is no evidence of chest wall mass.  The visualized thyroid is unremarkable.  There are no enla
rged axillary lymph nodes.  There are no enlarged mediastinal or hilar lymph nodes by CT criteria. T
here is bilateral peribronchial thickening.  There are mild scattered air space opacities within the
 right upper and lower lobes.  There is no pleural effusion.  The central tracheobronchial tree is w
ithin normal limits.

 

Limited evaluation of the upper abdomen demonstrates a large ventral hernia containing loops of julissa
l. 

 

IMPRESSION:

No evidence of pulmonary embolism or aortic dissection. 

Moderate cardiomegaly with small to moderate pericardial effusion.

Bilateral peribronchial thickening with mild scattered air space opacities within the right upper an
d lower lobes.

Large ventral abdominal wall hernia containing multiple loops of bowel.  Please refer to CT abdomen 
and pelvis done same day for further details.

_____________________________________________ 

.Deep Hanson MD, MD           Date    Time 

Electronically viewed and signed by .Deep Hanson MD, MD on 07/05/2017 00:19 

 

D:  07/05/2017 00:19  T:  07/05/2017 00:19

.T/

## 2017-07-05 NOTE — RADRPT
PROCEDURE:   CT abdomen and pelvis without intravenous contrast. 

 

CLINICAL INDICATION: Pain.

 

TECHNIQUE:   CT of the abdomen/pelvis was performed utilizing axial images with reconstructions in s
agittal and coronal planes. The administered radiation dose is CTDI 29 mGy, DLP 1823 mGy-cm. 

 

COMPARISON: No pertinent prior examinations were submitted for comparison.

 

FINDINGS:

 

Visualized Chest: Some ground-glass nodular opacities are noted within the lung bases in a peribronc
hial distribution.  There is moderate cardiomegaly with small to moderate pericardial effusion.  Toni
e coronary artery calcifications are noted.

 

Abdomen:

 

The examination is limited by body habitus.  Parts of the anterior abdomen are not included in the f
ield of view.

 

The  spleen, pancreas, gallbladder,and adrenal glands are unremarkable.   The liver is diffusely dec
reased in attenuation, compatible with hepatic steatosis.

 

The kidneys are without hydronephrosis.  No definite urinary calculi are seen.

 

There is a large ventral abdominal hernia which contains most of the small bowel and parts of the la
rge bowel.  The anterior aspect of the hernia is not visualized.  There is no evidence of bowel obst
ruction.

 

There is no evidence of intra-abdominal adenopathy or free fluid.  

 

 

Pelvis:

 

There is no evidence of pelvic adenopathy or free fluid.  The prostate and bladder are unremarkable.

 

Osseous structures: Unremarkable.

 

IMPRESSION:

 

Patchy peribronchial nodular opacities in the lung bases likely due to pneumonia.

 

Moderate cardiomegaly with small to moderate pericardial effusion.

 

Hepatic steatosis.

 

Large ventral abdominal hernia containing most of the bowel.  No evidence of bowel obstruction.

 

RPTAT: HIKT

_____________________________________________ 

.Xu Gray MD, MD           Date    Time 

Electronically viewed and signed by .Xu Gray MD, MD on 07/05/2017 00:26 

 

D:  07/05/2017 00:26  T:  07/05/2017 00:26

.T/

## 2017-07-06 VITALS — DIASTOLIC BLOOD PRESSURE: 76 MMHG | RESPIRATION RATE: 19 BRPM | SYSTOLIC BLOOD PRESSURE: 145 MMHG

## 2017-07-06 VITALS — HEART RATE: 127 BPM

## 2017-07-06 VITALS — HEART RATE: 105 BPM

## 2017-07-06 VITALS — SYSTOLIC BLOOD PRESSURE: 117 MMHG | DIASTOLIC BLOOD PRESSURE: 82 MMHG | RESPIRATION RATE: 19 BRPM

## 2017-07-06 VITALS — SYSTOLIC BLOOD PRESSURE: 135 MMHG | DIASTOLIC BLOOD PRESSURE: 83 MMHG | RESPIRATION RATE: 18 BRPM | HEART RATE: 126 BPM

## 2017-07-06 VITALS — DIASTOLIC BLOOD PRESSURE: 77 MMHG | SYSTOLIC BLOOD PRESSURE: 133 MMHG | RESPIRATION RATE: 20 BRPM

## 2017-07-06 VITALS — RESPIRATION RATE: 19 BRPM | DIASTOLIC BLOOD PRESSURE: 58 MMHG | SYSTOLIC BLOOD PRESSURE: 124 MMHG

## 2017-07-06 VITALS — RESPIRATION RATE: 19 BRPM | HEART RATE: 118 BPM | DIASTOLIC BLOOD PRESSURE: 63 MMHG | SYSTOLIC BLOOD PRESSURE: 137 MMHG

## 2017-07-06 VITALS — HEART RATE: 98 BPM

## 2017-07-06 VITALS — RESPIRATION RATE: 16 BRPM | DIASTOLIC BLOOD PRESSURE: 86 MMHG | HEART RATE: 90 BPM | SYSTOLIC BLOOD PRESSURE: 117 MMHG

## 2017-07-06 LAB
ADD SCAN DIFF: NO
ANION GAP SERPL CALC-SCNC: 19 MMOL/L (ref 8–16)
BASOPHILS # BLD AUTO: 0 10^3/UL (ref 0–0.1)
BASOPHILS NFR BLD: 0.4 % (ref 0–2)
BUN SERPL-MCNC: 19 MG/DL (ref 7–20)
CALCIUM SERPL-MCNC: 9.3 MG/DL (ref 8.4–10.2)
CHLORIDE SERPL-SCNC: 97 MMOL/L (ref 97–110)
CO2 SERPL-SCNC: 31 MMOL/L (ref 21–31)
CREAT SERPL-MCNC: 0.57 MG/DL (ref 0.61–1.24)
EOSINOPHIL # BLD: 0.1 10^3/UL (ref 0–0.5)
EOSINOPHIL NFR BLD: 1.8 % (ref 0–7)
ERYTHROCYTE [DISTWIDTH] IN BLOOD BY AUTOMATED COUNT: 12.5 % (ref 11.5–14.5)
GLUCOSE SERPL-MCNC: 311 MG/DL (ref 70–220)
HCT VFR BLD CALC: 49.1 % (ref 42–52)
HGB BLD-MCNC: 16.1 G/DL (ref 14–18)
LYMPHOCYTES # BLD AUTO: 1.9 10^3/UL (ref 0.8–2.9)
LYMPHOCYTES NFR BLD AUTO: 26.9 % (ref 15–51)
MCH RBC QN AUTO: 30.6 PG (ref 29–33)
MCHC RBC AUTO-ENTMCNC: 32.8 G/DL (ref 32–37)
MCV RBC AUTO: 93.2 FL (ref 82–101)
MONOCYTES # BLD: 0.7 10^3/UL (ref 0.3–0.9)
MONOCYTES NFR BLD: 10 % (ref 0–11)
NEUTROPHILS # BLD: 4.3 10^3/UL (ref 1.6–7.5)
NEUTROPHILS NFR BLD AUTO: 60.3 % (ref 39–77)
NRBC # BLD MANUAL: 0 10^3/UL (ref 0–0)
NRBC BLD QL: 0 /100WBC (ref 0–0)
PLATELET # BLD: 166 10^3/UL (ref 140–415)
PMV BLD AUTO: 12.4 FL (ref 7.4–10.4)
POTASSIUM SERPL-SCNC: 4 MMOL/L (ref 3.5–5.1)
RBC # BLD AUTO: 5.27 10^6/UL (ref 4.7–6.1)
SODIUM SERPL-SCNC: 143 MMOL/L (ref 135–144)
WBC # BLD AUTO: 7.1 10^3/UL (ref 4.8–10.8)

## 2017-07-06 RX ADMIN — CEFTRIAXONE SCH MLS/HR: 2 INJECTION, SOLUTION INTRAVENOUS at 02:24

## 2017-07-06 RX ADMIN — DILTIAZEM HYDROCHLORIDE SCH MG: 60 TABLET, FILM COATED ORAL at 12:19

## 2017-07-06 RX ADMIN — DILTIAZEM HYDROCHLORIDE SCH MG: 60 TABLET, FILM COATED ORAL at 05:30

## 2017-07-06 RX ADMIN — DOXYCYCLINE SCH MLS/HR: 100 INJECTION, POWDER, LYOPHILIZED, FOR SOLUTION INTRAVENOUS at 08:44

## 2017-07-06 RX ADMIN — PANTOPRAZOLE SODIUM SCH MG: 40 INJECTION, POWDER, FOR SOLUTION INTRAVENOUS at 05:25

## 2017-07-06 RX ADMIN — MAGNESIUM OXIDE TAB 400 MG (241.3 MG ELEMENTAL MG) SCH MG: 400 (241.3 MG) TAB at 20:48

## 2017-07-06 RX ADMIN — MAGNESIUM OXIDE TAB 400 MG (241.3 MG ELEMENTAL MG) SCH MG: 400 (241.3 MG) TAB at 08:42

## 2017-07-06 RX ADMIN — LISINOPRIL SCH MG: 5 TABLET ORAL at 08:42

## 2017-07-06 NOTE — PN
Date/Time of Note


Date/Time of Note


DATE: 7/6/17 


TIME: 19:11





Assessment/Plan


VTE Prophylaxis


VTE Prophylaxis Intervention:  SCD's





Lines/Catheters


IV Catheter Type (from Nrs):  Saline Lock





Assessment/Plan


Chief Complaint/Hosp Course


#1 chest pain likely secondary to A. fib with RVR and/or pneumonia


ACS ruled out


Cardiology consultation appreciate





#2 A. fib with RVR: Now off Cardizem drip


Anticoagulation held in the past secondary to history of GI bleed





#3  Pneumonia


Rocephin and azithromycin





#4  Diabetes-uncontrolled


A1c undetectable


Increase insulin regimen


Diabetic educator





#5  Hypertension-controlled


Continue ACE inhibitor





#6  Lower extremity edema with an elevated BNP 


2D echo shows a normal EF and chest x-ray does not show any vascular congestion


Lower extremity edema likely secondary to obesity with lymphedema





 DVT and GI prophylaxis: SCDs, Protonix


Problems:  





Subjective


24 Hr Interval Summary


Constitutional:  no complaints





Exam/Review of Systems


Vital Signs


Vitals





 Vital Signs








  Date Time  Temp Pulse Resp B/P Pulse Ox O2 Delivery O2 Flow Rate FiO2


 


7/6/17 16:00  127      


 


7/6/17 15:13 98.2  19 117/82 94   


 


7/6/17 08:00      Nasal Cannula 5.0 














 Intake and Output   


 


 7/5/17 7/5/17 7/6/17





 15:00 23:00 07:00


 


Intake Total  1100 ml 350 ml


 


Output Total  2200 ml 3125 ml


 


Balance  -1100 ml -2775 ml











Exam


Constitutional:  alert


Respiratory:  clear to auscultation


Cardiovascular:  regular rate and rhythm


Gastrointestinal:  distended, non-tender, soft


Musculoskeletal:  nl extremities to inspection





Results


Result Diagram:  


7/6/17 0620                                                                    

            7/6/17 0620





Results 24 hrs





Laboratory Tests








Test


  7/5/17


21:15 7/6/17


02:22 7/6/17


06:20 7/6/17


07:52


 


Bedside Glucose 270  H 292  H  265  H


 


White Blood Count   7.1   


 


Red Blood Count   5.27   


 


Hemoglobin   16.1   


 


Hematocrit   49.1   


 


Mean Corpuscular Volume   93.2   


 


Mean Corpuscular Hemoglobin   30.6   


 


Mean Corpuscular Hemoglobin


Concent 


  


  32.8  


  


 


 


Red Cell Distribution Width   12.5   


 


Platelet Count   166   


 


Mean Platelet Volume   12.4  H 


 


Neutrophils %   60.3   


 


Lymphocytes %   26.9   


 


Monocytes %   10.0   


 


Eosinophils %   1.8   


 


Basophils %   0.4   


 


Nucleated Red Blood Cells %   0.0   


 


Neutrophils #   4.3   


 


Lymphocytes #   1.9   


 


Monocytes #   0.7   


 


Eosinophils #   0.1   


 


Basophils #   0.0   


 


Nucleated Red Blood Cells #   0.0   


 


Sodium Level   143   


 


Potassium Level   4.0   


 


Chloride Level   97   


 


Carbon Dioxide Level   31   


 


Anion Gap   19  H 


 


Blood Urea Nitrogen   19   


 


Creatinine   0.57  L 


 


Glucose Level   311  H 


 


Hemoglobin A1c      


 


Calcium Level   9.3   


 


Phosphorus Level   4.4   


 


Magnesium Level   1.6  L 














Test


  7/6/17


11:16 7/6/17


11:56 7/6/17


17:04 


 


 


Lab Scanned Report REFERENCE LAB     


 


Bedside Glucose  376  H 297  H 











Medications


Medications





 Current Medications


Ondansetron HCl (Zofran Inj) 4 mg Q6H  PRN IV NAUSEA AND/OR VOMITING;  Start 7/5 /17 at 01:00


Nitroglycerin (Nitroglycerin (Sl Tab) 0.4 Mg) 1 tab Q5M  PRN SL CHEST PAIN;  

Start 7/5/17 at 01:00


Acetaminophen (Tylenol Tab) 650 mg Q6H  PRN PO PAIN LEVEL 1-3 OR FEVER;  Start 7 /5/17 at 01:00


Docusate Sodium (Colace) 100 mg Q12H  PRN PO CONSTIPATION;  Start 7/5/17 at 01:

00


Bisacodyl (Dulcolax) 5 mg DAILY  PRN PO CONSTIPATION;  Start 7/5/17 at 01:00


Lisinopril 5 mg 5 mg DAILY PO  Last administered on 7/6/17at 08:42; Admin Dose 

5 MG;  Start 7/5/17 at 09:00


Ceftriaxone Sodium 50 ml @  100 mls/hr Q24H IVPB  Last administered on 7/6/17at 

02:24; Admin Dose 100 MLS/HR;  Start 7/5/17 at 02:00


Doxycycline Hyclate/Sodium Chloride (Vibramycin/NS) 250 ml @  250 mls/hr Q12 

IVPB  Last administered on 7/6/17at 08:44; Admin Dose 250 MLS/HR;  Start 7/5/17 

at 01:00


Diagnostic Test (Pha) (Accu-Chek) 1 ea 02 XX  Last administered on 7/6/17at 02:

24; Admin Dose 1 EA;  Start 7/6/17 at 02:00


Miscellaneous Information 1 ea NOTE XX ;  Start 7/5/17 at 03:00


Glucose (Glutose) 15 gm Q15M  PRN PO DECREASED GLUCOSE;  Start 7/5/17 at 03:00


Glucose (Glutose) 22.5 gm Q15M  PRN PO DECREASED GLUCOSE;  Start 7/5/17 at 03:00


Dextrose (D50w Syringe) 25 ml Q15M  PRN IV DECREASED GLUCOSE;  Start 7/5/17 at 

03:00


Dextrose (D50w Syringe) 50 ml Q15M  PRN IV DECREASED GLUCOSE;  Start 7/5/17 at 

03:00


Glucagon (Glucagen) 1 mg Q15M  PRN IM DECREASED GLUCOSE;  Start 7/5/17 at 03:00


Glucose (Glutose) 15 gm Q15M  PRN BUCCAL DECREASED GLUCOSE;  Start 7/5/17 at 03:

00


Morphine Sulfate (morphine) 2 mg Q3H  PRN IV PAIN Last administered on 7/5/17at 

23:41; Admin Dose 2 MG;  Start 7/5/17 at 10:30


Insulin Glargine (Lantus) 35 unit DAILY@20 SC  Last administered on 7/5/17at 21:

22; Admin Dose 35 UNIT;  Start 7/5/17 at 20:00


Magnesium Oxide (Mag-Ox 400) 400 mg BID PO  Last administered on 7/6/17at 08:42

; Admin Dose 400 MG;  Start 7/5/17 at 21:00


Pantoprazole (Protonix Tab) 40 mg DAILY@06 PO ;  Start 7/7/17 at 06:00











CONSTANTIN GARNER Jul 6, 2017 19:19

## 2017-07-06 NOTE — CONS
Date/Time of Note


Date/Time of Note


DATE: 7/6/17 


TIME: 13:25





Assessment/Plan


Assessment/Plan


Additional Assessment/Plan


Likely pneumonia


Acute decompensated diastolic congestive heart failure


Atrial fibrillation with rapid ventricular rates, improved, not on 

anticoagulation secondary to history of GI bleed


Preserved ejection fraction


Sepsis


Hypertension


Small pericardial effusion





-Heart rate trend improved, would switch Cardizem to long-acting regimen.  

Continue diuretics as blood pressure and renal function permits and plan to 

switch to p.o tomorrow.  Maintain potassium above 4.0 and magnesium above 2.0.





Consultation Date/Type/Reason


Admit Date/Time


Jul 4, 2017 at 22:57


Initial Consult Date





Type of Consultation:  cv





24 HR Interval Summary


Free Text/Dictation


Patient feeling better, less shortness of breath.  Still with lower examinee 

edema.  Denies palpitations





Exam/Review of Systems


Vital Signs


Vitals





 Vital Signs








  Date Time  Temp Pulse Resp B/P Pulse Ox O2 Delivery O2 Flow Rate FiO2


 


7/6/17 12:00  105      


 


7/6/17 11:06 98.2  19 124/58 94   


 


7/6/17 08:00      Nasal Cannula 5.0 














 Intake and Output   


 


 7/5/17 7/5/17 7/6/17





 15:00 23:00 07:00


 


Intake Total  1100 ml 350 ml


 


Output Total  2200 ml 3125 ml


 


Balance  -1100 ml -2775 ml











Exam


No apparent distress, sitting up and eating lunch


Constitutional:  alert, oriented


Head:  normocephalic


Neck:  supple


Respiratory:  other (Coarse breath sounds bilaterally, no wheezing)


Cardiovascular:  irregular rhythm, other (S1-S2 heard)


Gastrointestinal:  bowel sounds, distended, non-tender, soft


Extremities:  edema





Results


Result Diagram:  


7/6/17 0620                                                                    

            7/6/17 0620





Results 24 hrs





Laboratory Tests








Test


  7/5/17


18:10 7/5/17


21:15 7/6/17


02:22 7/6/17


06:20


 


Bedside Glucose 244  H 270  H 292  H 


 


White Blood Count    7.1  


 


Red Blood Count    5.27  


 


Hemoglobin    16.1  


 


Hematocrit    49.1  


 


Mean Corpuscular Volume    93.2  


 


Mean Corpuscular Hemoglobin    30.6  


 


Mean Corpuscular Hemoglobin


Concent 


  


  


  32.8  


 


 


Red Cell Distribution Width    12.5  


 


Platelet Count    166  


 


Mean Platelet Volume    12.4  H


 


Neutrophils %    60.3  


 


Lymphocytes %    26.9  


 


Monocytes %    10.0  


 


Eosinophils %    1.8  


 


Basophils %    0.4  


 


Nucleated Red Blood Cells %    0.0  


 


Neutrophils #    4.3  


 


Lymphocytes #    1.9  


 


Monocytes #    0.7  


 


Eosinophils #    0.1  


 


Basophils #    0.0  


 


Nucleated Red Blood Cells #    0.0  


 


Sodium Level    143  


 


Potassium Level    4.0  


 


Chloride Level    97  


 


Carbon Dioxide Level    31  


 


Anion Gap    19  H


 


Blood Urea Nitrogen    19  


 


Creatinine    0.57  L


 


Glucose Level    311  H


 


Hemoglobin A1c      


 


Calcium Level    9.3  


 


Phosphorus Level    4.4  


 


Magnesium Level    1.6  L














Test


  7/6/17


07:52 7/6/17


11:16 7/6/17


11:56 


 


 


Bedside Glucose 265  H  376  H 


 


Lab Scanned Report  REFERENCE LAB    











Medications


Medications





 Current Medications


Ondansetron HCl (Zofran Inj) 4 mg Q6H  PRN IV NAUSEA AND/OR VOMITING;  Start 7/5 /17 at 01:00


Nitroglycerin (Nitroglycerin (Sl Tab) 0.4 Mg) 1 tab Q5M  PRN SL CHEST PAIN;  

Start 7/5/17 at 01:00


Acetaminophen (Tylenol Tab) 650 mg Q6H  PRN PO PAIN LEVEL 1-3 OR FEVER;  Start 7 /5/17 at 01:00


Docusate Sodium (Colace) 100 mg Q12H  PRN PO CONSTIPATION;  Start 7/5/17 at 01:

00


Bisacodyl (Dulcolax) 5 mg DAILY  PRN PO CONSTIPATION;  Start 7/5/17 at 01:00


Pantoprazole (Protonix Iv) 40 mg DAILY@06 IV  Last administered on 7/6/17at 05:

25; Admin Dose 40 MG;  Start 7/5/17 at 06:00


Furosemide (Lasix) 40 mg DAILY@06 IV  Last administered on 7/6/17at 05:30; 

Admin Dose 40 MG;  Start 7/5/17 at 06:00


Lisinopril 5 mg 5 mg DAILY PO  Last administered on 7/6/17at 08:42; Admin Dose 

5 MG;  Start 7/5/17 at 09:00


Ceftriaxone Sodium 50 ml @  100 mls/hr Q24H IVPB  Last administered on 7/6/17at 

02:24; Admin Dose 100 MLS/HR;  Start 7/5/17 at 02:00


Doxycycline Hyclate/Sodium Chloride (Vibramycin/NS) 250 ml @  250 mls/hr Q12 

IVPB  Last administered on 7/6/17at 08:44; Admin Dose 250 MLS/HR;  Start 7/5/17 

at 01:00


Diagnostic Test (Pha) (Accu-Chek) 1 ea 02 XX  Last administered on 7/6/17at 02:

24; Admin Dose 1 EA;  Start 7/6/17 at 02:00


Miscellaneous Information 1 ea NOTE XX ;  Start 7/5/17 at 03:00


Glucose (Glutose) 15 gm Q15M  PRN PO DECREASED GLUCOSE;  Start 7/5/17 at 03:00


Glucose (Glutose) 22.5 gm Q15M  PRN PO DECREASED GLUCOSE;  Start 7/5/17 at 03:00


Dextrose (D50w Syringe) 25 ml Q15M  PRN IV DECREASED GLUCOSE;  Start 7/5/17 at 

03:00


Dextrose (D50w Syringe) 50 ml Q15M  PRN IV DECREASED GLUCOSE;  Start 7/5/17 at 

03:00


Glucagon (Glucagen) 1 mg Q15M  PRN IM DECREASED GLUCOSE;  Start 7/5/17 at 03:00


Glucose (Glutose) 15 gm Q15M  PRN BUCCAL DECREASED GLUCOSE;  Start 7/5/17 at 03:

00


Morphine Sulfate (morphine) 2 mg Q3H  PRN IV PAIN Last administered on 7/5/17at 

23:41; Admin Dose 2 MG;  Start 7/5/17 at 10:30


Diltiazem HCl (Cardizem) 60 mg Q6 PO  Last administered on 7/6/17at 12:19; 

Admin Dose 60 MG;  Start 7/5/17 at 18:00


Insulin Glargine (Lantus) 35 unit DAILY@20 SC  Last administered on 7/5/17at 21:

22; Admin Dose 35 UNIT;  Start 7/5/17 at 20:00


Magnesium Oxide 400 mg 400 mg BID PO  Last administered on 7/6/17at 08:42; 

Admin Dose 400 MG;  Start 7/5/17 at 21:00


Magnesium Sulfate (Magnesium Sulfate 4 Gm/100 ml) 100 ml @  25 mls/hr ONCE IVPB

  Last administered on 7/6/17at 12:19; Admin Dose 25 MLS/HR;  Start 7/6/17 at 12

:00;  Stop 7/6/17 at 15:59











Torey Gold DO Jul 6, 2017 13:29

## 2017-07-07 VITALS — RESPIRATION RATE: 20 BRPM | SYSTOLIC BLOOD PRESSURE: 128 MMHG | DIASTOLIC BLOOD PRESSURE: 76 MMHG

## 2017-07-07 VITALS — SYSTOLIC BLOOD PRESSURE: 157 MMHG | DIASTOLIC BLOOD PRESSURE: 70 MMHG | RESPIRATION RATE: 19 BRPM

## 2017-07-07 VITALS — SYSTOLIC BLOOD PRESSURE: 131 MMHG | DIASTOLIC BLOOD PRESSURE: 58 MMHG | RESPIRATION RATE: 19 BRPM

## 2017-07-07 VITALS — HEART RATE: 160 BPM

## 2017-07-07 VITALS — HEART RATE: 100 BPM

## 2017-07-07 VITALS — HEART RATE: 137 BPM

## 2017-07-07 VITALS — HEART RATE: 140 BPM

## 2017-07-07 VITALS — HEART RATE: 125 BPM

## 2017-07-07 VITALS — HEART RATE: 150 BPM

## 2017-07-07 LAB
ADD SCAN DIFF: NO
ANION GAP SERPL CALC-SCNC: 14 MMOL/L (ref 8–16)
BASOPHILS # BLD AUTO: 0 10^3/UL (ref 0–0.1)
BASOPHILS NFR BLD: 0.2 % (ref 0–2)
BUN SERPL-MCNC: 19 MG/DL (ref 7–20)
CALCIUM SERPL-MCNC: 8.7 MG/DL (ref 8.4–10.2)
CHLORIDE SERPL-SCNC: 92 MMOL/L (ref 97–110)
CO2 SERPL-SCNC: 36 MMOL/L (ref 21–31)
CREAT SERPL-MCNC: 0.64 MG/DL (ref 0.61–1.24)
EOSINOPHIL # BLD: 0.3 10^3/UL (ref 0–0.5)
EOSINOPHIL NFR BLD: 3.2 % (ref 0–7)
ERYTHROCYTE [DISTWIDTH] IN BLOOD BY AUTOMATED COUNT: 12.7 % (ref 11.5–14.5)
GLUCOSE SERPL-MCNC: 193 MG/DL (ref 70–220)
HCT VFR BLD CALC: 51.6 % (ref 42–52)
HGB BLD-MCNC: 16.6 G/DL (ref 14–18)
LYMPHOCYTES # BLD AUTO: 3 10^3/UL (ref 0.8–2.9)
LYMPHOCYTES NFR BLD AUTO: 34.7 % (ref 15–51)
MCH RBC QN AUTO: 30.3 PG (ref 29–33)
MCHC RBC AUTO-ENTMCNC: 32.2 G/DL (ref 32–37)
MCV RBC AUTO: 94.2 FL (ref 82–101)
MONOCYTES # BLD: 0.7 10^3/UL (ref 0.3–0.9)
MONOCYTES NFR BLD: 7.4 % (ref 0–11)
NEUTROPHILS # BLD: 4.7 10^3/UL (ref 1.6–7.5)
NEUTROPHILS NFR BLD AUTO: 53.8 % (ref 39–77)
NRBC # BLD MANUAL: 0 10^3/UL (ref 0–0)
NRBC BLD QL: 0 /100WBC (ref 0–0)
PLATELET # BLD: 179 10^3/UL (ref 140–415)
PMV BLD AUTO: 12.7 FL (ref 7.4–10.4)
POTASSIUM SERPL-SCNC: 3.8 MMOL/L (ref 3.5–5.1)
RBC # BLD AUTO: 5.48 10^6/UL (ref 4.7–6.1)
SODIUM SERPL-SCNC: 138 MMOL/L (ref 135–144)
WBC # BLD AUTO: 8.8 10^3/UL (ref 4.8–10.8)

## 2017-07-07 RX ADMIN — LISINOPRIL SCH MG: 5 TABLET ORAL at 08:15

## 2017-07-07 RX ADMIN — MAGNESIUM OXIDE TAB 400 MG (241.3 MG ELEMENTAL MG) SCH MG: 400 (241.3 MG) TAB at 08:14

## 2017-07-07 NOTE — CONS
Date/Time of Note


Date/Time of Note


DATE: 7/7/17 


TIME: 12:02





Assessment/Plan


Assessment/Plan


Additional Assessment/Plan


Likely pneumonia


Acute decompensated diastolic congestive heart failure


Atrial fibrillation with rapid ventricular rates, not on anticoagulation 

secondary to history of GI bleed


Preserved ejection fraction


Sepsis


Hypertension


Small pericardial effusion





-Patient still with episodes of rapid ventricular rates.  Would order 

additional short acting p.o. Cardizem as needed and load with IV digoxin.  

Supplement potassium to maintain above 4.0 and magnesium above 2.0.  Will start 

low-dose Aldactone and continue as renal function and blood pressure permits.





Consultation Date/Type/Reason


Admit Date/Time


Jul 4, 2017 at 22:57


Type of Consultation:  cv





24 HR Interval Summary


Free Text/Dictation


Patient denies palpitations, shortness of breath or dizziness





Exam/Review of Systems


Vital Signs


Vitals





 Vital Signs








  Date Time  Temp Pulse Resp B/P Pulse Ox O2 Delivery O2 Flow Rate FiO2


 


7/7/17 12:00  125      


 


7/7/17 11:01 98.2  19 131/58 93   


 


7/7/17 08:30      Nasal Cannula 2.0 














 Intake and Output   


 


 7/6/17 7/6/17 7/7/17





 15:00 23:00 07:00


 


Intake Total  800 ml 300 ml


 


Output Total 1000 ml 1500 ml 1350 ml


 


Balance -1000 ml -700 ml -1050 ml











Exam


No apparent distress, no dyspnea with speaking


Constitutional:  alert, obese, oriented


Head:  normocephalic


Respiratory:  other (Coarse breath sounds bilaterally, no wheezing)


Cardiovascular:  irregular rhythm, other (S1-S2 heard)


Gastrointestinal:  bowel sounds, distended, non-tender, soft


Extremities:  edema





Results


Result Diagram:  


7/7/17 0540                                                                    

            7/7/17 0540





Results 24 hrs





Laboratory Tests








Test


  7/6/17


17:04 7/6/17


20:59 7/7/17


02:10 7/7/17


05:40


 


Bedside Glucose 297  H 219   197   


 


White Blood Count    8.8  #


 


Red Blood Count    5.48  


 


Hemoglobin    16.6  


 


Hematocrit    51.6  


 


Mean Corpuscular Volume    94.2  


 


Mean Corpuscular Hemoglobin    30.3  


 


Mean Corpuscular Hemoglobin


Concent 


  


  


  32.2  


 


 


Red Cell Distribution Width    12.7  


 


Platelet Count    179  


 


Mean Platelet Volume    12.7  H


 


Neutrophils %    53.8  


 


Lymphocytes %    34.7  


 


Monocytes %    7.4  


 


Eosinophils %    3.2  


 


Basophils %    0.2  


 


Nucleated Red Blood Cells %    0.0  


 


Neutrophils #    4.7  


 


Lymphocytes #    3.0  H


 


Monocytes #    0.7  


 


Eosinophils #    0.3  


 


Basophils #    0.0  


 


Nucleated Red Blood Cells #    0.0  


 


Sodium Level    138  


 


Potassium Level    3.8  


 


Chloride Level    92  L


 


Carbon Dioxide Level    36  H


 


Anion Gap    14  


 


Blood Urea Nitrogen    19  


 


Creatinine    0.64  


 


Glucose Level    193  #


 


Calcium Level    8.7  


 


Magnesium Level    1.9  














Test


  7/7/17


08:30 


  


  


 


 


Bedside Glucose 180     











Medications


Medications





 Current Medications


Ondansetron HCl (Zofran Inj) 4 mg Q6H  PRN IV NAUSEA AND/OR VOMITING;  Start 7/5 /17 at 01:00


Nitroglycerin (Nitroglycerin (Sl Tab) 0.4 Mg) 1 tab Q5M  PRN SL CHEST PAIN;  

Start 7/5/17 at 01:00


Acetaminophen (Tylenol Tab) 650 mg Q6H  PRN PO PAIN LEVEL 1-3 OR FEVER;  Start 7 /5/17 at 01:00


Docusate Sodium (Colace) 100 mg Q12H  PRN PO CONSTIPATION Last administered on 7 /7/17at 05:19; Admin Dose 100 MG;  Start 7/5/17 at 01:00


Bisacodyl (Dulcolax) 5 mg DAILY  PRN PO CONSTIPATION Last administered on 7/7/ 17at 05:19; Admin Dose 5 MG;  Start 7/5/17 at 01:00


Lisinopril (Zestril) 5 mg DAILY PO  Last administered on 7/7/17at 08:15; Admin 

Dose 5 MG;  Start 7/5/17 at 09:00


Diagnostic Test (Pha) (Accu-Chek) 1 ea 02 XX  Last administered on 7/7/17at 02:

17; Admin Dose 1 EA;  Start 7/6/17 at 02:00


Miscellaneous Information 1 ea NOTE XX ;  Start 7/5/17 at 03:00


Glucose (Glutose) 15 gm Q15M  PRN PO DECREASED GLUCOSE;  Start 7/5/17 at 03:00


Glucose (Glutose) 22.5 gm Q15M  PRN PO DECREASED GLUCOSE;  Start 7/5/17 at 03:00


Dextrose (D50w Syringe) 25 ml Q15M  PRN IV DECREASED GLUCOSE;  Start 7/5/17 at 

03:00


Dextrose (D50w Syringe) 50 ml Q15M  PRN IV DECREASED GLUCOSE;  Start 7/5/17 at 

03:00


Glucagon (Glucagen) 1 mg Q15M  PRN IM DECREASED GLUCOSE;  Start 7/5/17 at 03:00


Glucose (Glutose) 15 gm Q15M  PRN BUCCAL DECREASED GLUCOSE;  Start 7/5/17 at 03:

00


Morphine Sulfate (morphine) 2 mg Q3H  PRN IV PAIN Last administered on 7/5/17at 

23:41; Admin Dose 2 MG;  Start 7/5/17 at 10:30


Magnesium Oxide (Mag-Ox 400) 400 mg BID PO  Last administered on 7/7/17at 08:14

; Admin Dose 400 MG;  Start 7/5/17 at 21:00


Pantoprazole (Protonix Tab) 40 mg DAILY@06 PO  Last administered on 7/7/17at 05:

19; Admin Dose 40 MG;  Start 7/7/17 at 06:00


Insulin Glargine (Lantus) 40 unit DAILY@20 SC  Last administered on 7/6/17at 21:

08; Admin Dose 40 UNIT;  Start 7/6/17 at 20:00


Levofloxacin (Levaquin) 750 mg DAILY@06 PO  Last administered on 7/7/17at 05:19

; Admin Dose 750 MG;  Start 7/7/17 at 06:00


Diltiazem HCl (Cardizem Cd) 180 mg BID PO  Last administered on 7/7/17at 09:24; 

Admin Dose 180 MG;  Start 7/7/17 at 09:00


Diltiazem HCl (Cardizem) 60 mg Q6  PRN PO hr>110;  Start 7/7/17 at 12:00;  

Status UNV


Digoxin (Digoxin) 250 mcg ONCE  ONCE IV ;  Start 7/7/17 at 12:00;  Stop 7/7/17 

at 12:01;  Status UNV


Digoxin (Digoxin) 250 mcg NOW  ONCE IV ;  Start 7/7/17 at 18:00;  Stop 7/7/17 

at 18:01;  Status UNV


Digoxin (Digoxin) 0.125 mg DAILY@13 PO ;  Start 7/8/17 at 13:00;  Status UNV











Torey Gold DO Jul 7, 2017 12:03

## 2017-07-07 NOTE — DS
Date/Time of Note


Date/Time of Note


DATE: 7/7/17 


TIME: 14:33





Discharge Summary


Admission/Discharge Info


Admit Date/Time


Jul 4, 2017 at 22:57


Discharge Date/Time


Jul 7, 2017 at 14:05


Discharge Diagnosis


Leaving AMA





#1 chest pain likely secondary to A. fib with RVR and/or pneumonia


ACS ruled out


Cardiology consultation appreciated





#2 A. fib with RVR: Heart rate still elevated on Cardizem p.o. but patient 

leaving AMA


Anticoagulation held in the past secondary to history of GI bleed





#3  Pneumonia





#4  Diabetes-uncontrolled


A1c undetectable


Patient leaving AMA


Diabetic educator spoke with the patient





#5  Hypertension-controlled in-house





#6  Lower extremity edema with an elevated BNP 


2D echo shows a normal EF and chest x-ray does not show any vascular congestion


Lower extremity edema likely secondary to obesity with lymphedema, patient 

advised about lifestyle changes


Patient Condition:  Fair


Hospital Course


Patient is a 57-year-old male with a history of uncontrolled diabetes, alcohol 

abuse and obesity.  Patient presents with chest pain probably secondary to 

palpitations from his A. fib with RVR.  Patient also diagnosed with pneumonia 

and was started on antibiotics.  Patient was started on diltiazem drip and 

eventually transitioned to diltiazem p.o. but heart rate was still elevated.  

Patient's diabetes was really uncontrolled as A1c was undetectable and sugars 

were severely elevated.  Patient did speak to diabetic educator and was started 

on subcu insulin sugars were controlled in house.  Patient had a liver 

ultrasound showed fatty liver CT abdomen showed large ventral hernia.  Patient 

heart rate was still elevated on the day of discharge the patient wanted to 

leave AMA he understood the risk.


Follow-up Plan


No follow-up instructions given to the patient as patient left AMA


Primary Care Provider


Care Physician No Primary


Time spent on discharge:  < 30 minutes


Pending Labs





Laboratory Tests








Test


  7/6/17


17:04 7/6/17


20:59 7/7/17


02:10 7/7/17


05:40


 


Bedside Glucose


  297mg/dL


() 219mg/dL


() 197mg/dL


() 


 


 


White Blood Count


  


  


  


  8.810^3/ul


(4.8-10.8)


 


Red Blood Count


  


  


  


  5.4810^6/ul


(4.70-6.10)


 


Hemoglobin


  


  


  


  16.6g/dl


(14.0-18.0)


 


Hematocrit


  


  


  


  51.6%


(42.0-52.0)


 


Mean Corpuscular Volume


  


  


  


  94.2fl


(82.0-101.0)


 


Mean Corpuscular Hemoglobin


  


  


  


  30.3pg


(29.0-33.0)


 


Mean Corpuscular Hemoglobin


Concent 


  


  


  32.2g/dl


(32.0-37.0)


 


Red Cell Distribution Width


  


  


  


  12.7%


(11.5-14.5)


 


Platelet Count


  


  


  


  07797^3/UL


(140-415)


 


Mean Platelet Volume


  


  


  


  12.7fl


(7.4-10.4)


 


Neutrophils %


  


  


  


  53.8%


(39.0-77.0)


 


Lymphocytes %


  


  


  


  34.7%


(15.0-51.0)


 


Monocytes %


  


  


  


  7.4%


(0.0-11.0)


 


Eosinophils %    3.2% (0.0-7.0) 


 


Basophils %    0.2% (0.0-2.0) 


 


Nucleated Red Blood Cells %


  


  


  


  0.0/100WBC


(0.0-0.0)


 


Neutrophils #


  


  


  


  4.710^3/ul


(1.6-7.5)


 


Lymphocytes #


  


  


  


  3.010^3/ul


(0.8-2.9)


 


Monocytes #


  


  


  


  0.710^3/ul


(0.3-0.9)


 


Eosinophils #


  


  


  


  0.310^3/ul


(0.0-0.5)


 


Basophils #


  


  


  


  0.010^3/ul


(0.0-0.1)


 


Nucleated Red Blood Cells #


  


  


  


  0.010^3/ul


(0.0-0.0)


 


Sodium Level


  


  


  


  138mmol/L


(135-144)


 


Potassium Level


  


  


  


  3.8mmol/L


(3.5-5.1)


 


Chloride Level


  


  


  


  92mmol/L


()


 


Carbon Dioxide Level


  


  


  


  36mmol/L


(21-31)


 


Anion Gap    14 (8-16) 


 


Blood Urea Nitrogen    19mg/dl (7-20) 


 


Creatinine


  


  


  


  0.64mg/dl


(0.61-1.24)


 


Glucose Level


  


  


  


  193mg/dl


()


 


Calcium Level


  


  


  


  8.7mg/dl


(8.4-10.2)


 


Magnesium Level


  


  


  


  1.9mg/dl


(1.7-2.5)














Test


  7/7/17


08:30 7/7/17


12:17 


  


 


 


Bedside Glucose


  180mg/dL


() 172mg/dL


() 


  


 

















CONSTANTIN GARNER Jul 7, 2017 14:40

## 2019-03-31 ENCOUNTER — HOSPITAL ENCOUNTER (INPATIENT)
Dept: HOSPITAL 10 - E/R | Age: 59
Discharge: LEFT BEFORE DISCHARGE | DRG: 309 | End: 2019-03-31
Attending: INTERNAL MEDICINE | Admitting: INTERNAL MEDICINE
Payer: MEDICAID

## 2019-03-31 ENCOUNTER — HOSPITAL ENCOUNTER (INPATIENT)
Dept: HOSPITAL 91 - TEL | Age: 59
Discharge: LEFT BEFORE DISCHARGE | DRG: 309 | End: 2019-03-31
Payer: MEDICAID

## 2019-03-31 VITALS — SYSTOLIC BLOOD PRESSURE: 138 MMHG | HEART RATE: 82 BPM | DIASTOLIC BLOOD PRESSURE: 90 MMHG | RESPIRATION RATE: 20 BRPM

## 2019-03-31 VITALS
WEIGHT: 315 LBS | HEIGHT: 69 IN | WEIGHT: 315 LBS | BODY MASS INDEX: 46.65 KG/M2 | HEIGHT: 69 IN | BODY MASS INDEX: 46.65 KG/M2

## 2019-03-31 VITALS — HEART RATE: 85 BPM | RESPIRATION RATE: 20 BRPM | DIASTOLIC BLOOD PRESSURE: 97 MMHG | SYSTOLIC BLOOD PRESSURE: 131 MMHG

## 2019-03-31 VITALS — RESPIRATION RATE: 20 BRPM | DIASTOLIC BLOOD PRESSURE: 96 MMHG | SYSTOLIC BLOOD PRESSURE: 137 MMHG | HEART RATE: 95 BPM

## 2019-03-31 VITALS — HEART RATE: 60 BPM | DIASTOLIC BLOOD PRESSURE: 105 MMHG | RESPIRATION RATE: 22 BRPM | SYSTOLIC BLOOD PRESSURE: 167 MMHG

## 2019-03-31 VITALS — HEART RATE: 75 BPM

## 2019-03-31 VITALS — HEART RATE: 132 BPM

## 2019-03-31 VITALS — SYSTOLIC BLOOD PRESSURE: 141 MMHG | DIASTOLIC BLOOD PRESSURE: 92 MMHG | HEART RATE: 110 BPM

## 2019-03-31 DIAGNOSIS — I10: ICD-10-CM

## 2019-03-31 DIAGNOSIS — E11.65: ICD-10-CM

## 2019-03-31 DIAGNOSIS — D64.9: ICD-10-CM

## 2019-03-31 DIAGNOSIS — R14.0: ICD-10-CM

## 2019-03-31 DIAGNOSIS — E66.01: ICD-10-CM

## 2019-03-31 DIAGNOSIS — I48.2: Primary | ICD-10-CM

## 2019-03-31 DIAGNOSIS — I73.9: ICD-10-CM

## 2019-03-31 LAB
ABNORMAL IP MESSAGE: 1
ADD MAN DIFF?: NO
ADD UMIC: NO
ANION GAP: 10 (ref 5–13)
B-TYPE NATRIURETIC PEPTIDE: 1500 PG/ML (ref 0–125)
BASOPHIL #: 0.1 10^3/UL (ref 0–0.1)
BASOPHILS %: 0.6 % (ref 0–2)
BLOOD UREA NITROGEN: 23 MG/DL (ref 7–20)
CALCIUM: 9 MG/DL (ref 8.4–10.2)
CARBON DIOXIDE: 24 MMOL/L (ref 21–31)
CHLORIDE: 102 MMOL/L (ref 97–110)
CK INDEX: 1.6
CK INDEX: 1.6
CK-MB: 1.96 NG/ML (ref 0–2.4)
CK-MB: 2.03 NG/ML (ref 0–2.4)
CREATINE KINASE: 119 IU/L (ref 23–200)
CREATINE KINASE: 127 IU/L (ref 23–200)
CREATININE: 0.87 MG/DL (ref 0.61–1.24)
EOSINOPHILS #: 0.2 10^3/UL (ref 0–0.5)
EOSINOPHILS %: 1.9 % (ref 0–7)
GLUCOSE: 496 MG/DL (ref 70–220)
HEMATOCRIT: 39.8 % (ref 42–52)
HEMOGLOBIN: 12.8 G/DL (ref 14–18)
IMMATURE GRANS #M: 0.05 10^3/UL (ref 0–0.03)
IMMATURE GRANS % (M): 0.6 % (ref 0–0.43)
INR: 1.19
LYMPHOCYTES #: 2.2 10^3/UL (ref 0.8–2.9)
LYMPHOCYTES %: 25.2 % (ref 15–51)
MEAN CORPUSCULAR HEMOGLOBIN: 30.7 PG (ref 29–33)
MEAN CORPUSCULAR HGB CONC: 32.2 G/DL (ref 32–37)
MEAN CORPUSCULAR VOLUME: 95.4 FL (ref 82–101)
MEAN PLATELET VOLUME: 13.7 FL (ref 7.4–10.4)
MONOCYTE #: 0.7 10^3/UL (ref 0.3–0.9)
MONOCYTES %: 8.2 % (ref 0–11)
NEUTROPHIL #: 5.5 10^3/UL (ref 1.6–7.5)
NEUTROPHILS %: 63.5 % (ref 39–77)
NUCLEATED RED BLOOD CELLS #: 0 10^3/UL (ref 0–0)
NUCLEATED RED BLOOD CELLS%: 0 /100WBC (ref 0–0)
PLATELET COUNT: 129 10^3/UL (ref 140–415)
POSITIVE DIFF: (no result)
POTASSIUM: 4.4 MMOL/L (ref 3.5–5.1)
PROTIME: 15.2 SEC (ref 11.9–14.9)
PT RATIO: 1.2
RED BLOOD COUNT: 4.17 10^6/UL (ref 4.7–6.1)
RED CELL DISTRIBUTION WIDTH: 14.2 % (ref 11.5–14.5)
SODIUM: 136 MMOL/L (ref 135–144)
TROPONIN-I: 0.02 NG/ML (ref 0–0.12)
TROPONIN-I: < 0.012 NG/ML (ref 0–0.12)
TROPONIN-I: < 0.012 NG/ML (ref 0–0.12)
UR ASCORBIC ACID: NEGATIVE MG/DL
UR BILIRUBIN (DIP): NEGATIVE MG/DL
UR BLOOD (DIP): NEGATIVE MG/DL
UR CLARITY: CLEAR
UR COLOR: (no result)
UR GLUCOSE (DIP): (no result) MG/DL
UR KETONES (DIP): NEGATIVE MG/DL
UR LEUKOCYTE ESTERASE (DIP): NEGATIVE LEU/UL
UR NITRITE (DIP): NEGATIVE MG/DL
UR PH (DIP): 5 (ref 5–9)
UR SPECIFIC GRAVITY (DIP): 1.02 (ref 1–1.03)
UR TOTAL PROTEIN (DIP): NEGATIVE MG/DL
UR UROBILINOGEN (DIP): (no result) MG/DL
WHITE BLOOD COUNT: 8.6 10^3/UL (ref 4.8–10.8)

## 2019-03-31 PROCEDURE — 93005 ELECTROCARDIOGRAM TRACING: CPT

## 2019-03-31 PROCEDURE — 36415 COLL VENOUS BLD VENIPUNCTURE: CPT

## 2019-03-31 PROCEDURE — 80048 BASIC METABOLIC PNL TOTAL CA: CPT

## 2019-03-31 PROCEDURE — 71045 X-RAY EXAM CHEST 1 VIEW: CPT

## 2019-03-31 PROCEDURE — 84484 ASSAY OF TROPONIN QUANT: CPT

## 2019-03-31 PROCEDURE — 82553 CREATINE MB FRACTION: CPT

## 2019-03-31 PROCEDURE — 85610 PROTHROMBIN TIME: CPT

## 2019-03-31 PROCEDURE — 96374 THER/PROPH/DIAG INJ IV PUSH: CPT

## 2019-03-31 PROCEDURE — 83880 ASSAY OF NATRIURETIC PEPTIDE: CPT

## 2019-03-31 PROCEDURE — 81003 URINALYSIS AUTO W/O SCOPE: CPT

## 2019-03-31 PROCEDURE — 85025 COMPLETE CBC W/AUTO DIFF WBC: CPT

## 2019-03-31 PROCEDURE — 82962 GLUCOSE BLOOD TEST: CPT

## 2019-03-31 PROCEDURE — 99285 EMERGENCY DEPT VISIT HI MDM: CPT

## 2019-03-31 PROCEDURE — 82550 ASSAY OF CK (CPK): CPT

## 2019-03-31 PROCEDURE — 3E0F7GC INTRODUCTION OF OTHER THERAPEUTIC SUBSTANCE INTO RESPIRATORY TRACT, VIA NATURAL OR ARTIFICIAL OPENING: ICD-10-PCS

## 2019-03-31 RX ADMIN — DILTIAZEM HCL 125 MG/125ML IN DEXTROSE 5% IV SOLN (1 MG/ML) 1 MLS/HR: 125-5/125 SOLUTION at 05:59

## 2019-03-31 RX ADMIN — DILTIAZEM HCL 125 MG/125ML IN DEXTROSE 5% IV SOLN (1 MG/ML) SCH MLS/HR: 125-5/125 SOLUTION at 14:51

## 2019-03-31 RX ADMIN — DILTIAZEM HCL 125 MG/125ML IN DEXTROSE 5% IV SOLN (1 MG/ML) 1 MLS/HR: 125-5/125 SOLUTION at 14:51

## 2019-03-31 RX ADMIN — LIDOCAINE HYDROCHLORIDE 1 MLS/HR: 10 INJECTION, SOLUTION EPIDURAL; INFILTRATION; INTRACAUDAL; PERINEURAL at 06:46

## 2019-03-31 RX ADMIN — RIVAROXABAN 1 MG: 20 TABLET, FILM COATED ORAL at 17:11

## 2019-03-31 RX ADMIN — INSULIN HUMAN 1 UNIT: 100 INJECTION, SOLUTION PARENTERAL at 04:35

## 2019-03-31 RX ADMIN — DILTIAZEM HCL 125 MG/125ML IN DEXTROSE 5% IV SOLN (1 MG/ML) SCH MLS/HR: 125-5/125 SOLUTION at 09:10

## 2019-03-31 RX ADMIN — METOPROLOL TARTRATE 1 MG: 5 INJECTION, SOLUTION INTRAVENOUS at 02:53

## 2019-03-31 RX ADMIN — INSULIN ASPART 1 UNIT: 100 INJECTION, SOLUTION INTRAVENOUS; SUBCUTANEOUS at 11:45

## 2019-03-31 RX ADMIN — INSULIN ASPART 1 UNIT: 100 INJECTION, SOLUTION INTRAVENOUS; SUBCUTANEOUS at 08:07

## 2019-03-31 RX ADMIN — DILTIAZEM HCL 125 MG/125ML IN DEXTROSE 5% IV SOLN (1 MG/ML) 1 MLS/HR: 125-5/125 SOLUTION at 09:10

## 2019-03-31 RX ADMIN — ASPIRIN 81 MG CHEWABLE TABLET 1 MG: 81 TABLET CHEWABLE at 09:03

## 2019-03-31 RX ADMIN — INSULIN ASPART 1 UNIT: 100 INJECTION, SOLUTION INTRAVENOUS; SUBCUTANEOUS at 17:14

## 2019-03-31 RX ADMIN — DILTIAZEM HCL 125 MG/125ML IN DEXTROSE 5% IV SOLN (1 MG/ML) SCH MLS/HR: 125-5/125 SOLUTION at 05:59

## 2019-03-31 RX ADMIN — ACETAMINOPHEN 1 MG: 325 TABLET, FILM COATED ORAL at 11:40

## 2019-03-31 RX ADMIN — DILTIAZEM HCL 125 MG/125ML IN DEXTROSE 5% IV SOLN (1 MG/ML) SCH MLS/HR: 125-5/125 SOLUTION at 07:08

## 2019-03-31 RX ADMIN — DILTIAZEM HCL 125 MG/125ML IN DEXTROSE 5% IV SOLN (1 MG/ML) SCH MLS/HR: 125-5/125 SOLUTION at 15:39

## 2019-03-31 RX ADMIN — INSULIN GLARGINE 1 UNITS: 100 INJECTION, SOLUTION SUBCUTANEOUS at 09:06

## 2019-03-31 RX ADMIN — DILTIAZEM HCL 125 MG/125ML IN DEXTROSE 5% IV SOLN (1 MG/ML) 1 MLS/HR: 125-5/125 SOLUTION at 07:08

## 2019-03-31 RX ADMIN — DILTIAZEM HCL 125 MG/125ML IN DEXTROSE 5% IV SOLN (1 MG/ML) 1 MLS/HR: 125-5/125 SOLUTION at 15:39

## 2019-03-31 NOTE — ERD
ER Documentation


Chief Complaint


Chief Complaint





bib ra from home for cp, given 162 asa, 2 sprays nitro on route, hx of afib





HPI


This is a 58-year-old male with a prior history of atrial fibrillation, history 


of obesity, who presents for evaluation of chest pain.  She presented with 


atrial fibrillation with rapid ventricular response, in the field he was given 


aspirin, as well as 2 sublingual sprays of nitro, his chest pain improved 


significantly.  Additionally has a history of diabetes.  He denies recent 


alcohol use, he is currently on Xarelto.





ROS


All systems reviewed and are negative except as per history of present illness.





Allergies


Allergies:  


Coded Allergies:  


     No Known Allergy (Unverified , 7/4/17)





PMhx/Soc


History of Surgery:  Yes


Anesthesia Reaction:  No


Hx Neurological Disorder:  No


Hx Respiratory Disorders:  Yes


Hx Cardiac Disorders:  Yes


Hx Psychiatric Problems:  No


Hx Miscellaneous Medical Probl:  No


Hx Alcohol Use:  No


Hx Substance Use:  No


Hx Tobacco Use:  No


Smoking Status:  Never smoker





Physical Exam


Vitals





Vital Signs


  Date      Temp  Pulse  Resp  B/P (MAP)   Pulse Ox  O2          O2 Flow    FiO2


Time                                                 Delivery    Rate


   3/31/19           72    28     171/109        95  Nasal             2.0


     04:02                          (129)            Cannula


   3/31/19  98.7    120    20      128/98       100  Room Air


     02:45                          (108)


   3/31/19                                           Nasal


     01:48                                           Cannula


   3/31/19  98.7    141    20      148/89       100


     01:42                          (108)





Physical Exam


Const:   Alert, awake, nontoxic


Head:   Atraumatic 


Eyes:    Normal Conjunctiva


ENT:    Normal External Ears, Nose and Mouth.


Neck:               Full range of motion. No meningismus.


Resp:   Clear to auscultation bilaterally


Cardio:   Irregularly irregular, no murmurs


Abd:    Soft, non tender, obese, no rebound or guarding non distended. Normal 


bowel sounds


Skin:   No petechiae or rashes


Back:   No midline or flank tenderness


Ext:    No cyanosis, or edema


Neur:   Awake and alert


Psych:    Normal Mood and Affect


Result Diagram:  


3/31/19 0235                                                                    


           3/31/19 0235





Results 24 hrs





Laboratory Tests


      Test
                                  3/31/19
02:35  3/31/19
04:38


      White Blood Count                       8.6 10^3/ul


      Red Blood Count                        4.17 10^6/ul


      Hemoglobin                                12.8 g/dl


      Hematocrit                                   39.8 %


      Mean Corpuscular Volume                     95.4 fl


      Mean Corpuscular Hemoglobin                 30.7 pg


      Mean Corpuscular Hemoglobin
Concent      32.2 g/dl 
  



      Red Cell Distribution Width                  14.2 %


      Platelet Count                          129 10^3/UL


      Mean Platelet Volume                        13.7 fl


      Immature Granulocytes %                     0.600 %


      Neutrophils %                                63.5 %


      Lymphocytes %                                25.2 %


      Monocytes %                                   8.2 %


      Eosinophils %                                 1.9 %


      Basophils %                                   0.6 %


      Nucleated Red Blood Cells %             0.0 /100WBC


      Immature Granulocytes #               0.050 10^3/ul


      Neutrophils #                           5.5 10^3/ul


      Lymphocytes #                           2.2 10^3/ul


      Monocytes #                             0.7 10^3/ul


      Eosinophils #                           0.2 10^3/ul


      Basophils #                             0.1 10^3/ul


      Nucleated Red Blood Cells #             0.0 10^3/ul


      Prothrombin Time                           15.2 Sec


      Prothrombin Time Ratio                          1.2


      INR International Normalized
Ratio            1.19 
  



      Urine Color                          STRAW


      Urine Clarity                        CLEAR


      Urine pH                                        5.0


      Urine Specific Gravity                        1.023


      Urine Ketones                        NEGATIVE mg/dL


      Urine Nitrite                        NEGATIVE mg/dL


      Urine Bilirubin                      NEGATIVE mg/dL


      Urine Urobilinogen                         1+ mg/dL


      Urine Leukocyte Esterase
            NEGATIVE
[place]/ul  



      Urine Hemoglobin                     NEGATIVE mg/dL


      Urine Glucose                              3+ mg/dL


      Urine Total Protein                  NEGATIVE mg/dl


      Sodium Level                             136 mmol/L


      Potassium Level                          4.4 mmol/L


      Chloride Level                           102 mmol/L


      Carbon Dioxide Level                      24 mmol/L


      Anion Gap                                        10


      Blood Urea Nitrogen                        23 mg/dl


      Creatinine                               0.87 mg/dl


      Est Glomerular Filtrat Rate
mL/min   > 60 mL/min 
    



      Glucose Level                             496 mg/dl


      Calcium Level                             9.0 mg/dl


      Troponin I                           < 0.012 ng/ml


      B-Type Natriuretic Peptide               1500 PG/ML


      Bedside Glucose                                          378 mg/dL





Current Medications


 Medications
   Dose
          Sig/Amber
       Start Time
   Status  Last


 (Trade)       Ordered        Route
 PRN     Stop Time              Admin
Dose


                              Reason                                Admin


 Metoprolol
    5 mg           Q5M
 IV
       3/31/19       DC           3/31/19


Tartrate
                                    02:00
                       02:53



(Lopressor)                                  3/31/19 02:11


 Insulin        10 unit        ONCE  ONCE
    3/31/19                    3/31/19


Human
                        SC
            05:00
                       04:35



Regular
                                     3/31/19 05:01


(Humulin R)








Procedures/MDM


This is a 58-year-old male with a prior history of atrial fibrillation, 


diabetes, who presents for evaluation of chest pain in the setting of atrial 


fibrillation with rapid ventricular response.  The patient was given IV 


metoprolol, as he is on oral metoprolol at home, this resulted in good rate 


control, his EKG showed no evidence of ischemia, and his troponin was negative. 


He was noted to have hyperglycemia with a blood sugar in the 400s, however he 


had no evidence of DKA, he is given subcutaneous insulin in the ED.  Patient 


remained hemodynamically stable, he will be admitted for further work-up of his 


chest pain, as well as blood sugar control.  He is currently on anticoagulation 


with Xarelto.








Accepting Care Team:


Current data and ongoing care discussed.


Primary:      [facility]


Consulting:      none


Outstanding Data:   none








EKG: 


Rate/Rhythm:       Atrial fibrillation with a rapid ventricular response


QRS, ST, T-waves:    No changes consistent w/ acute ischemia


Impression:      No evidence of ischemia.  Atrial fibrillation





Departure


Diagnosis:  


   Primary Impression:  


   Chest pain


   Chest pain type:  unspecified  Qualified Codes:  R07.9 - Chest pain, 


   unspecified


   Additional Impression:  


   Hyperglycemia


Condition:  Stable











CYNTHIA HUERTAS MD               Mar 31, 2019 04:45

## 2019-03-31 NOTE — PN
Date/Time of Note


Date/Time of Note


DATE: 3/31/19 


TIME: 18:39





Assessment/Plan


VTE Prophylaxis


Risk score (from Ns)>0 risk:  5


SCD applied (from Ns):  Yes


Pharmacological prophylaxis:  rivaroxaban





Lines/Catheters


IV Catheter Type (from [facility]):  Saline Lock


Urinary Cath still in place:  No





Assessment/Plan


Hospital Course


1.  Atrial fibrillation with RVR


-Cardiology consultation appreciated, restarted metoprolol and will try to wean 


off Cardizem


-Continue home Xarelto





2.  Chest pain: Rule out ACS


-Troponins are normal


-Cardiology consult appreciated, stress test tomorrow





3.  Hypertension: Currently BP on the lower side.  Monitor closely given patient


is on Cardizem drip





4.  Abdominal distention


-Patient with significant abdominal distention but he states is relatively acute


over the past several days, patient denies any history of liver disease or 


alcohol abuse


-Follow-up on ultrasound of the abdomen to evaluate for cirrhosis and ascites





5.  History of AAA surgery: No acute issue





6.  Diabetes


-Sugars are currently elevated, increase basal and mealtime insulin


-Follow-up on A1c





Prophylaxis: Xarelto





DC planning: Follow-up on ultrasound of the abdomen and stress test, anticipate 


DC to home in 1-2 days


Result Diagram:  


3/31/19 0235                                                                    


           3/31/19 0235





Results 24hrs





Laboratory Tests


Test
                 3/31/19
02:35  3/31/19
04:38  3/31/19
05:36  3/31/19
08:02


White Blood Count             8.6


Red Blood Count            4.17  #L


Hemoglobin                 12.8  #L


Hematocrit                 39.8  #L


Mean Corpuscular             95.4


Volume


Mean Corpuscular             30.7


Hemoglobin


Mean Corpuscular            32.2  
  
              
              



Hemoglobin
Concent


Red Cell                     14.2


Distribution Width


Platelet Count              129  #L


Mean Platelet Volume        13.7  H


Immature                   0.600  H


Granulocytes %


Neutrophils %                63.5


Lymphocytes %                25.2


Monocytes %                   8.2


Eosinophils %                 1.9


Basophils %                   0.6


Nucleated Red Blood           0.0


Cells %


Immature                   0.050  H


Granulocytes #


Neutrophils #                 5.5


Lymphocytes #                 2.2


Monocytes #                   0.7


Eosinophils #                 0.2


Basophils #                   0.1


Nucleated Red Blood           0.0


Cells #


Prothrombin Time            15.2  H


Prothrombin Time              1.2


Ratio


INR International           1.19  
  
              
              



Normalized
Ratio


Urine Color           STRAW


Urine Clarity         CLEAR


Urine pH                      5.0


Urine Specific              1.023


Gravity


Urine Ketones         NEGATIVE


Urine Nitrite         NEGATIVE


Urine Bilirubin       NEGATIVE


Urine Urobilinogen            1+  H


Urine Leukocyte       NEGATIVE


Esterase


Urine Hemoglobin      NEGATIVE


Urine Glucose                 3+  H


Urine Total Protein   NEGATIVE


Sodium Level                  136


Potassium Level               4.4


Chloride Level                102


Carbon Dioxide Level           24


Anion Gap                      10


Blood Urea Nitrogen           23  H


Creatinine                   0.87


Est Glomerular        > 60  
        
              
              



Filtrat Rate
mL/min


Glucose Level               496  *H


Calcium Level                 9.0


Troponin I            < 0.012                             0.021


B-Type Natriuretic          1500  H


Peptide


Bedside Glucose                             378  H                        276  H


Creatine Kinase                                             127


Creatine Kinase                                             1.6


Index


Creatinine Kinase MB                                       2.03


(Mass)


Test
                 3/31/19
09:02  3/31/19
11:13  3/31/19
11:38  3/31/19
17:10


Bedside Glucose              331  H                         220           244  H


Creatine Kinase                              119


Creatine Kinase                              1.6


Index


Creatinine Kinase MB                        1.96


(Mass)


Troponin I                           < 0.012








Subjective


24 Hr Interval Summary


Cardiovascular:  chest pain


Gastrointestinal:  pain





Exam/Review of Systems


Exam


Vitals





Vital Signs


  Date      Temp  Pulse  Resp  B/P (MAP)   Pulse Ox  O2          O2 Flow    FiO2


Time                                                 Delivery    Rate


   3/31/19  97.4     85    20      131/97        92  Room Air


     16:03                          (108)


   3/31/19                                                             2.0


     09:12








Intake and Output





3/30/19


3/30/19


3/31/19





1515:00


23:00


07:00





IntakeIntake Total


500 ml





OutputOutput Total


1000 ml





BalanceBalance


-500 ml











Constitutional:  alert, oriented


Respiratory:  clear to auscultation


Cardiovascular:  irregular rhythm


Gastrointestinal:  soft, distended


Musculoskeletal:  nl extremities to inspection





Results


Results 24hrs





Laboratory Tests


Test
                 3/31/19
02:35  3/31/19
04:38  3/31/19
05:36  3/31/19
08:02


White Blood Count             8.6


Red Blood Count            4.17  #L


Hemoglobin                 12.8  #L


Hematocrit                 39.8  #L


Mean Corpuscular             95.4


Volume


Mean Corpuscular             30.7


Hemoglobin


Mean Corpuscular            32.2  
  
              
              



Hemoglobin
Concent


Red Cell                     14.2


Distribution Width


Platelet Count              129  #L


Mean Platelet Volume        13.7  H


Immature                   0.600  H


Granulocytes %


Neutrophils %                63.5


Lymphocytes %                25.2


Monocytes %                   8.2


Eosinophils %                 1.9


Basophils %                   0.6


Nucleated Red Blood           0.0


Cells %


Immature                   0.050  H


Granulocytes #


Neutrophils #                 5.5


Lymphocytes #                 2.2


Monocytes #                   0.7


Eosinophils #                 0.2


Basophils #                   0.1


Nucleated Red Blood           0.0


Cells #


Prothrombin Time            15.2  H


Prothrombin Time              1.2


Ratio


INR International           1.19  
  
              
              



Normalized
Ratio


Urine Color           STRAW


Urine Clarity         CLEAR


Urine pH                      5.0


Urine Specific              1.023


Gravity


Urine Ketones         NEGATIVE


Urine Nitrite         NEGATIVE


Urine Bilirubin       NEGATIVE


Urine Urobilinogen            1+  H


Urine Leukocyte       NEGATIVE


Esterase


Urine Hemoglobin      NEGATIVE


Urine Glucose                 3+  H


Urine Total Protein   NEGATIVE


Sodium Level                  136


Potassium Level               4.4


Chloride Level                102


Carbon Dioxide Level           24


Anion Gap                      10


Blood Urea Nitrogen           23  H


Creatinine                   0.87


Est Glomerular        > 60  
        
              
              



Filtrat Rate
mL/min


Glucose Level               496  *H


Calcium Level                 9.0


Troponin I            < 0.012                             0.021


B-Type Natriuretic          1500  H


Peptide


Bedside Glucose                             378  H                        276  H


Creatine Kinase                                             127


Creatine Kinase                                             1.6


Index


Creatinine Kinase MB                                       2.03


(Mass)


Test
                 3/31/19
09:02  3/31/19
11:13  3/31/19
11:38  3/31/19
17:10


Bedside Glucose              331  H                         220           244  H


Creatine Kinase                              119


Creatine Kinase                              1.6


Index


Creatinine Kinase MB                        1.96


(Mass)


Troponin I                           < 0.012








Medications


Medication





Current Medications


IV Flush (NS 3 ml) 3 ml PER PROTOCOL IV ;  Start 3/31/19 at 05:30


Ondansetron HCl (Zofran Inj) 4 mg Q6H  PRN IV NAUSEA/VOMITING;  Start 3/31/19 at


05:30


Aspirin (Aspirin) 81 mg DAILY PO  Last administered on 3/31/19at 09:03; Admin 


Dose 81 MG;  Start 3/31/19 at 09:00


Nitroglycerin (Nitroglycerin (Sl Tab) 0.4 Mg) 1 tab Q5M  PRN SL .CHEST PAIN;  


Start 3/31/19 at 05:30


Acetaminophen (Tylenol Tab) 650 mg Q6H  PRN PO .PAIN 1-3 OR TEMP Last 


administered on 3/31/19at 11:40; Admin Dose 650 MG;  Start 3/31/19 at 05:30


Diltiazem HCl 125 ml @ 5 mls/hr TITRATE IV  Last administered on 3/31/19at 


15:39; Admin Dose 10 MLS/HR;  Start 3/31/19 at 06:00


Diagnostic Test (Pha) (Accu-Chek) 1 ea 02 XX ;  Start 4/1/19 at 02:00


Insulin Glargine (Lantus) 23 units DAILY@0800 SC  Last administered on 3/31/19at


09:06; Admin Dose 23 UNITS;  Start 3/31/19 at 08:00


Insulin Aspart (Novolog Insulin Pen) 6 unit WITH  MEALS SC  Last administered on


3/31/19at 17:14; Admin Dose 6 UNIT;  Start 3/31/19 at 07:55


Insulin Aspart (Novolog Insulin Pen) NOVOLOG *MODERATE* ALGORITHM WITH MEALS  


BEDTIME SC  Last administered on 3/31/19at 17:14; Admin Dose 6 UNIT;  Start 


3/31/19 at 07:55


Rivaroxaban (Xarelto) 20 mg WITH  DINNER PO  Last administered on 3/31/19at 


17:11; Admin Dose 20 MG;  Start 3/31/19 at 17:55


Miscellaneous Information 1 ea NOTE XX ;  Start 3/31/19 at 06:30


Glucose (Glutose) 15 gm Q15M  PRN PO DECREASED GLUCOSE;  Start 3/31/19 at 06:30


Glucose (Glutose) 22.5 gm Q15M  PRN PO DECREASED GLUCOSE;  Start 3/31/19 at 


06:30


Dextrose (D50w Syringe) 25 ml Q15M  PRN IV DECREASED GLUCOSE;  Start 3/31/19 at 


06:30


Dextrose (D50w Syringe) 50 ml Q15M  PRN IV DECREASED GLUCOSE;  Start 3/31/19 at 


06:30


Glucagon (Glucagen) 1 mg Q15M  PRN IM DECREASED GLUCOSE;  Start 3/31/19 at 06:30


Glucose (Glutose) 15 gm Q15M  PRN BUCCAL DECREASED GLUCOSE;  Start 3/31/19 at 


06:30


Metoprolol Succinate (Toprol Xl) 100 mg BID PO ;  Start 3/31/19 at 21:00











CONSTANTIN GARNER                  Mar 31, 2019 18:42

## 2019-03-31 NOTE — CONS
DATE OF ADMISSION: 03/31/2019

DATE OF CONSULTATION:  03/31/2019

 

 

 

TYPE OF CONSULTATION:  Interventional cardiology consultation (on behalf of Dr. Gold).

 

REASON FOR CONSULTATION:  Chest pain and atrial fibrillation with rapid ventricular response.

 

CHIEF COMPLAINT:  Chest pain, palpitations.

 

HISTORY OF PRESENT ILLNESS:  Thank you for this referral.  History was from the patient who is a fair
 historian at best, from extensive review of the old chart.  This is a 58-year-old  gentleman
 with history of hypertension, diabetes, atrial fibrillation, AAA status post repair, who came to [facility] room for above complaint.  The patient says since yesterday, he has had on and off chest pain 
anteriorly and could not describe it well.  He was noted to be in atrial fibrillation with rapid vent
ricular response.  He has been placed on Cardizem drip and the heart rate is currently better control
led.  No chest pain or pressure now.  Troponin has been so far negative.  Denies any active bleeding 
to me.

 

PAST MEDICAL HISTORY:  History of atrial fibrillation documented 2 years ago at 2017 when he was seen
 by Dr. Gold.  At that time, he was not anticoagulated due to Dr. Gold's report history of GI ble
ed; however, the patient currently is on Xarelto.  History of abdominal surgery for apparently AAA re
pair per H and P note, history of diabetes, hypertension, dyslipidemia, obesity.

 

MEDICATIONS:  As per medication reconciliation, which were personally reviewed.

 

SOCIAL HISTORY:  The patient does not smoke at this point.

 

FAMILY HISTORY:  No history reported coronary artery disease.

 

ALLERGIES:  NO KNOWN DRUG ALLERGIES.

 

REVIEW OF SYSTEMS:  As above-mentioned.

 

PHYSICAL EXAMINATION:

VITAL SIGNS:  Temperature 97.6, heart of 82, blood pressure 138/90, respiratory rate of 20 and sattin
g 94%.

GENERAL  Obese gentleman.

HEENT:  Normocephalic, atraumatic.  Pupils are equal and round.

CARDIOVASCULAR:  Irregularly irregular, systolic murmur.

PULMONARY:  Anteriorly with no wheezes or rhonchi.

GASTROINTESTINAL:  Obese, soft, nontender.

EXTREMITIES:  Trivial edema.

NEUROLOGIC:  Awake, follows commands.

PSYCHIATRIC:  Appears to be calm and pleasant.

 

LABORATORY DATA:  Troponin has been negative x2.  Sodium 132, potassium 4.4, BUN of 23, creatinine 0.
87, glucose of 496.  BNP of 1500.  WBC of 8.6, hemoglobin 12.8, platelets 129.

 

DIAGNOSTIC DATA:  Chest x-ray done in the emergency room shows cardiomegaly, mild venous hypertension
.  EKG done shows atrial fibrillation with RVR, right bundle branch block.  Echocardiogram done in 20
17 and read by Dr. Gold showed ejection fraction about 55%.

 

ASSESSMENT AND PLAN:

1.  Chest pain, rule acute coronary syndrome.

2.  Atrial fibrillation with rapid ventricular response.

3.  Diabetes.

4.  Hypertension.

5.  Morbid obesity, rule out obstructive sleep apnea.

6.  Anemia.

7.  Peripheral vascular disease with abdominal aortic aneurysm repair.

 

RECOMMENDATIONS:  I will restart the patient back on p.o. metoprolol.  Titrate off of the Cardizem as
 tolerated.  Xarelto will be continued for now as long as no bleeding is noted.  I will hold the aspi
rin though since he is on full dose Xarelto.  Given his recurrent chest pain, I will schedule him for
 Lexiscan stress test tomorrow.  Echocardiogram has been ordered to be done.  Respiratory care will b
e managed.  Thank you for this referral.  We will continue to follow along with you until Dr. Gold 
returns on Monday.

 

 

Dictated By: CAMPBELL WILKES MD

 

AV/NTS

DD:    03/31/2019 15:58:13

DT:    03/31/2019 16:44:01

Conf#: [id number]

DID#:  [id number]

CC: KASEY RICHARDS MD; CONSTANTIN GARNER MD;*[facility]*

## 2019-03-31 NOTE — HP
Date/Time of Note


Date/Time of Note


DATE: 3/31/19 


TIME: 08:23





Assessment/Plan


VTE Prophylaxis


Pharmacological prophylaxis:  rivaroxaban





Lines/Catheters


[facility]ter Type (from [facility]):  Saline Lock


Urinary Cath still in place:  No





Assessment/Plan


Assessment/Plan





1.  Atrial fibrillation with RVR


-will place on Cardizem drip.  Patient was initially controlled on the 


metoprolol in the ER


-Patient on Xarelto at home





2.  Chest pain: Rule out ACS


-Follow-up with serial troponin and 2D echo


-Cardiology consult





3.  Hypertension: Currently BP on the lower side.  Monitor closely given patient


is on Cardizem drip





4.  History of AAA surgery: No acute issue


Result Diagram:  


3/31/19 0235                                                                    


           3/31/19 0235





Results 24hrs





Laboratory Tests


Test
                 3/31/19
02:35  3/31/19
04:38  3/31/19
05:36  3/31/19
08:02


White Blood Count             8.6


Red Blood Count            4.17  #L


Hemoglobin                 12.8  #L


Hematocrit                 39.8  #L


Mean Corpuscular             95.4


Volume


Mean Corpuscular             30.7


Hemoglobin


Mean Corpuscular            32.2  
  
              
              



Hemoglobin
Concent


Red Cell                     14.2


Distribution Width


Platelet Count              129  #L


Mean Platelet Volume        13.7  H


Immature                   0.600  H


Granulocytes %


Neutrophils %                63.5


Lymphocytes %                25.2


Monocytes %                   8.2


Eosinophils %                 1.9


Basophils %                   0.6


Nucleated Red Blood           0.0


Cells %


Immature                   0.050  H


Granulocytes #


Neutrophils #                 5.5


Lymphocytes #                 2.2


Monocytes #                   0.7


Eosinophils #                 0.2


Basophils #                   0.1


Nucleated Red Blood           0.0


Cells #


Prothrombin Time            15.2  H


Prothrombin Time              1.2


Ratio


INR International           1.19  
  
              
              



Normalized
Ratio


Urine Color           STRAW


Urine Clarity         CLEAR


Urine pH                      5.0


Urine Specific              1.023


Gravity


Urine Ketones         NEGATIVE


Urine Nitrite         NEGATIVE


Urine Bilirubin       NEGATIVE


Urine Urobilinogen            1+  H


Urine Leukocyte       NEGATIVE


Esterase


Urine Hemoglobin      NEGATIVE


Urine Glucose                 3+  H


Urine Total Protein   NEGATIVE


Sodium Level                  136


Potassium Level               4.4


Chloride Level                102


Carbon Dioxide Level           24


Anion Gap                      10


Blood Urea Nitrogen           23  H


Creatinine                   0.87


Est Glomerular        > 60  
        
              
              



Filtrat Rate
mL/min


Glucose Level               496  *H


Calcium Level                 9.0


Troponin I            < 0.012                             0.021


B-Type Natriuretic          1500  H


Peptide


Bedside Glucose                             378  H                        276  H


Creatine Kinase                                             127


Creatine Kinase                                             1.6


Index


Creatinine Kinase MB                                       2.03


(Mass)








HPI/ROS


Admit Date/Time


Admit Date/Time


Mar 31, 2019 at 03:59





Hx of Present Illness





This is a 58-year-old male with a history of hypertension, atrial fibrillation, 


abdominal surgery for AAA who presented to ER complaining of chest pain and 


palpitation.  Symptoms been going on for 1 day. 


When he presented to the ER he was found to be in rapid A-fib.  He was given 


metoprolol and initially rate was controlled, but shortly after rates became 


rapid again. EKG and first troponin negative





PMH/Family/Social


Past Medical History


Medical History:  other (See HPI)


Medications





Current Medications


IV Flush (NS 3 ml) 3 ml PER PROTOCOL IV ;  Start 3/31/19 at 05:30


Ondansetron HCl (Zofran Inj) 4 mg Q6H  PRN IV NAUSEA/VOMITING;  Start 3/31/19 at


05:30


Aspirin (Aspirin) 81 mg DAILY PO ;  Start 3/31/19 at 09:00


Nitroglycerin (Nitroglycerin (Sl Tab) 0.4 Mg) 1 tab Q5M  PRN SL .CHEST PAIN;  


Start 3/31/19 at 05:30


Acetaminophen (Tylenol Tab) 650 mg Q6H  PRN PO .PAIN 1-3 OR TEMP;  Start 3/31/19


at 05:30


Diltiazem HCl 125 ml @ 5 mls/hr TITRATE IV  Last administered on 3/31/19at 


07:08; Admin Dose 10 MLS/HR;  Start 3/31/19 at 06:00


Diagnostic Test (Pha) (Accu-Chek) 1 ea 02 XX ;  Start 4/1/19 at 02:00


Insulin Glargine (Lantus) 23 units DAILY@0800 SC ;  Start 3/31/19 at 08:00


Insulin Aspart (Novolog Insulin Pen) 6 unit WITH  MEALS SC  Last administered on


3/31/19at 08:07; Admin Dose 6 UNIT;  Start 3/31/19 at 07:55


Insulin Aspart (Novolog Insulin Pen) NOVOLOG *MODERATE* ALGORITHM WITH MEALS  


BEDTIME SC  Last administered on 3/31/19at 08:07; Admin Dose 8 UNIT;  Start 


3/31/19 at 07:55


Rivaroxaban (Xarelto) 20 mg WITH  DINNER PO ;  Start 3/31/19 at 17:55


Miscellaneous Information 1 ea NOTE XX ;  Start 3/31/19 at 06:30


Glucose (Glutose) 15 gm Q15M  PRN PO DECREASED GLUCOSE;  Start 3/31/19 at 06:30


Glucose (Glutose) 22.5 gm Q15M  PRN PO DECREASED GLUCOSE;  Start 3/31/19 at 


06:30


Dextrose (D50w Syringe) 25 ml Q15M  PRN IV DECREASED GLUCOSE;  Start 3/31/19 at 


06:30


Dextrose (D50w Syringe) 50 ml Q15M  PRN IV DECREASED GLUCOSE;  Start 3/31/19 at 


06:30


Glucagon (Glucagen) 1 mg Q15M  PRN IM DECREASED GLUCOSE;  Start 3/31/19 at 06:30


Glucose (Glutose) 15 gm Q15M  PRN BUCCAL DECREASED GLUCOSE;  Start 3/31/19 at 


06:30


Coded Allergies:  


     No Known Allergy (Unverified , 7/4/17)





Past Surgical History


Past Surgical Hx:  other (See HPI)





Family History


Significant Family History:  no pertinent family hx





Social History


Alcohol Use:  other


Smoking Status:  Unknown if ever smoked


Drug Use:  none





Exam/Review of Systems


Vital Signs


Vitals





Vital Signs


  Date      Temp  Pulse  Resp  B/P (MAP)   Pulse Ox  O2          O2 Flow    FiO2


Time                                                 Delivery    Rate


   3/31/19  97.7     95    20      137/96        96


     07:12                          (110)


   3/31/19                                           Nasal             2.0


     05:28                                           Cannula








Intake and Output





3/30/19


3/30/19


3/31/19





1515:00


23:00


07:00





IntakeIntake Total


500 ml





OutputOutput Total


1000 ml





BalanceBalance


-500 ml














Exam


Constitutional:  other (No acute distress)


Head:  normocephalic, atraumatic


Eyes:  EOMI, PERRL


Respiratory:  clear to auscultation, normal air movement


Cardiovascular:  irregular rhythm


Gastrointestinal:  soft


Extremities:  normal pulses











KASEY RICHARDS MD                 Mar 31, 2019 08:31
